# Patient Record
Sex: MALE | Race: WHITE | Employment: FULL TIME | URBAN - METROPOLITAN AREA
[De-identification: names, ages, dates, MRNs, and addresses within clinical notes are randomized per-mention and may not be internally consistent; named-entity substitution may affect disease eponyms.]

---

## 2020-03-08 PROBLEM — D17.21 LIPOMA OF RIGHT UPPER EXTREMITY: Status: ACTIVE | Noted: 2020-03-08

## 2021-01-17 ENCOUNTER — APPOINTMENT (OUTPATIENT)
Dept: CT IMAGING | Age: 36
End: 2021-01-17
Payer: COMMERCIAL

## 2021-01-17 ENCOUNTER — HOSPITAL ENCOUNTER (OUTPATIENT)
Age: 36
Setting detail: OBSERVATION
Discharge: HOME | End: 2021-01-18
Attending: INTERNAL MEDICINE | Admitting: INTERNAL MEDICINE
Payer: COMMERCIAL

## 2021-01-17 ENCOUNTER — APPOINTMENT (OUTPATIENT)
Dept: GENERAL RADIOLOGY | Age: 36
End: 2021-01-17
Payer: COMMERCIAL

## 2021-01-17 DIAGNOSIS — U07.1 COVID-19: ICD-10-CM

## 2021-01-17 DIAGNOSIS — R00.1 BRADYCARDIA: Primary | ICD-10-CM

## 2021-01-17 LAB
ABO/RH: NORMAL
ALBUMIN SERPL-MCNC: 4.2 GM/DL (ref 3.4–5)
ALBUMIN SERPL-MCNC: 4.2 GM/DL (ref 3.4–5)
ALP BLD-CCNC: 65 IU/L (ref 40–128)
ALP BLD-CCNC: 65 IU/L (ref 40–129)
ALT SERPL-CCNC: 16 U/L (ref 10–40)
ALT SERPL-CCNC: 16 U/L (ref 10–40)
ANION GAP SERPL CALCULATED.3IONS-SCNC: 10 MMOL/L (ref 4–16)
ANION GAP SERPL CALCULATED.3IONS-SCNC: 11 MMOL/L (ref 4–16)
ANTIBODY SCREEN: NEGATIVE
APTT: 31.1 SECONDS (ref 25.1–37.1)
AST SERPL-CCNC: 16 IU/L (ref 15–37)
AST SERPL-CCNC: 17 IU/L (ref 15–37)
BASOPHILS ABSOLUTE: 0 K/CU MM
BASOPHILS ABSOLUTE: 0 K/CU MM
BASOPHILS RELATIVE PERCENT: 0.3 % (ref 0–1)
BASOPHILS RELATIVE PERCENT: 0.5 % (ref 0–1)
BILIRUB SERPL-MCNC: 0.3 MG/DL (ref 0–1)
BILIRUB SERPL-MCNC: 0.6 MG/DL (ref 0–1)
BUN BLDV-MCNC: 13 MG/DL (ref 6–23)
BUN BLDV-MCNC: 14 MG/DL (ref 6–23)
C-REACTIVE PROTEIN, HIGH SENSITIVITY: 3 MG/L
CALCIUM SERPL-MCNC: 9 MG/DL (ref 8.3–10.6)
CALCIUM SERPL-MCNC: 9.4 MG/DL (ref 8.3–10.6)
CHLORIDE BLD-SCNC: 100 MMOL/L (ref 99–110)
CHLORIDE BLD-SCNC: 102 MMOL/L (ref 99–110)
CO2: 23 MMOL/L (ref 21–32)
CO2: 27 MMOL/L (ref 21–32)
CREAT SERPL-MCNC: 0.9 MG/DL (ref 0.9–1.3)
CREAT SERPL-MCNC: 1.1 MG/DL (ref 0.9–1.3)
D DIMER: 354 NG/ML(DDU)
DIFFERENTIAL TYPE: ABNORMAL
DIFFERENTIAL TYPE: ABNORMAL
EKG ATRIAL RATE: 47 BPM
EKG DIAGNOSIS: NORMAL
EKG P AXIS: 39 DEGREES
EKG P-R INTERVAL: 170 MS
EKG Q-T INTERVAL: 450 MS
EKG QRS DURATION: 86 MS
EKG QTC CALCULATION (BAZETT): 398 MS
EKG R AXIS: 29 DEGREES
EKG T AXIS: 28 DEGREES
EKG VENTRICULAR RATE: 47 BPM
EOSINOPHILS ABSOLUTE: 0 K/CU MM
EOSINOPHILS ABSOLUTE: 0.1 K/CU MM
EOSINOPHILS RELATIVE PERCENT: 0.1 % (ref 0–3)
EOSINOPHILS RELATIVE PERCENT: 0.8 % (ref 0–3)
FERRITIN: 33 NG/ML (ref 30–400)
FIBRINOGEN LEVEL: 258 MG/DL (ref 196.9–442.1)
GFR AFRICAN AMERICAN: >60 ML/MIN/1.73M2
GFR AFRICAN AMERICAN: >60 ML/MIN/1.73M2
GFR NON-AFRICAN AMERICAN: >60 ML/MIN/1.73M2
GFR NON-AFRICAN AMERICAN: >60 ML/MIN/1.73M2
GLUCOSE BLD-MCNC: 105 MG/DL (ref 70–99)
GLUCOSE BLD-MCNC: 112 MG/DL (ref 70–99)
HCT VFR BLD CALC: 45.8 % (ref 42–52)
HCT VFR BLD CALC: 46.3 % (ref 42–52)
HEMOGLOBIN: 15.1 GM/DL (ref 13.5–18)
HEMOGLOBIN: 15.2 GM/DL (ref 13.5–18)
IMMATURE NEUTROPHIL %: 0.1 % (ref 0–0.43)
IMMATURE NEUTROPHIL %: 0.2 % (ref 0–0.43)
INR BLD: 1.01 INDEX
LACTATE DEHYDROGENASE: 160 IU/L (ref 120–246)
LACTATE: 2 MMOL/L (ref 0.4–2)
LYMPHOCYTES ABSOLUTE: 1.5 K/CU MM
LYMPHOCYTES ABSOLUTE: 1.9 K/CU MM
LYMPHOCYTES RELATIVE PERCENT: 19.2 % (ref 24–44)
LYMPHOCYTES RELATIVE PERCENT: 30.4 % (ref 24–44)
MCH RBC QN AUTO: 29.5 PG (ref 27–31)
MCH RBC QN AUTO: 29.6 PG (ref 27–31)
MCHC RBC AUTO-ENTMCNC: 32.6 % (ref 32–36)
MCHC RBC AUTO-ENTMCNC: 33.2 % (ref 32–36)
MCV RBC AUTO: 89.3 FL (ref 78–100)
MCV RBC AUTO: 90.6 FL (ref 78–100)
MONOCYTES ABSOLUTE: 0.5 K/CU MM
MONOCYTES ABSOLUTE: 0.6 K/CU MM
MONOCYTES RELATIVE PERCENT: 7.2 % (ref 0–4)
MONOCYTES RELATIVE PERCENT: 8.3 % (ref 0–4)
NUCLEATED RBC %: 0 %
NUCLEATED RBC %: 0 %
PDW BLD-RTO: 13.2 % (ref 11.7–14.9)
PDW BLD-RTO: 13.4 % (ref 11.7–14.9)
PLATELET # BLD: 262 K/CU MM (ref 140–440)
PLATELET # BLD: 276 K/CU MM (ref 140–440)
PMV BLD AUTO: 9.8 FL (ref 7.5–11.1)
PMV BLD AUTO: 9.8 FL (ref 7.5–11.1)
POTASSIUM SERPL-SCNC: 4.1 MMOL/L (ref 3.5–5.1)
POTASSIUM SERPL-SCNC: 4.2 MMOL/L (ref 3.5–5.1)
PRO-BNP: 34.61 PG/ML
PROCALCITONIN: 0.04
PROTHROMBIN TIME: 12.2 SECONDS (ref 11.7–14.5)
RBC # BLD: 5.11 M/CU MM (ref 4.6–6.2)
RBC # BLD: 5.13 M/CU MM (ref 4.6–6.2)
SEGMENTED NEUTROPHILS ABSOLUTE COUNT: 3.8 K/CU MM
SEGMENTED NEUTROPHILS ABSOLUTE COUNT: 5.7 K/CU MM
SEGMENTED NEUTROPHILS RELATIVE PERCENT: 59.8 % (ref 36–66)
SEGMENTED NEUTROPHILS RELATIVE PERCENT: 73.1 % (ref 36–66)
SODIUM BLD-SCNC: 134 MMOL/L (ref 135–145)
SODIUM BLD-SCNC: 139 MMOL/L (ref 135–145)
TOTAL IMMATURE NEUTOROPHIL: 0.01 K/CU MM
TOTAL IMMATURE NEUTOROPHIL: 0.01 K/CU MM
TOTAL NUCLEATED RBC: 0 K/CU MM
TOTAL NUCLEATED RBC: 0 K/CU MM
TOTAL PROTEIN: 7.1 GM/DL (ref 6.4–8.2)
TOTAL PROTEIN: 7.1 GM/DL (ref 6.4–8.2)
TROPONIN T: <0.01 NG/ML
TROPONIN T: <0.01 NG/ML
VITAMIN D 25-HYDROXY: 37.01 NG/ML
WBC # BLD: 6.4 K/CU MM (ref 4–10.5)
WBC # BLD: 7.8 K/CU MM (ref 4–10.5)

## 2021-01-17 PROCEDURE — 36415 COLL VENOUS BLD VENIPUNCTURE: CPT

## 2021-01-17 PROCEDURE — 86140 C-REACTIVE PROTEIN: CPT

## 2021-01-17 PROCEDURE — 6370000000 HC RX 637 (ALT 250 FOR IP): Performed by: INTERNAL MEDICINE

## 2021-01-17 PROCEDURE — 94761 N-INVAS EAR/PLS OXIMETRY MLT: CPT

## 2021-01-17 PROCEDURE — 71045 X-RAY EXAM CHEST 1 VIEW: CPT

## 2021-01-17 PROCEDURE — 86141 C-REACTIVE PROTEIN HS: CPT

## 2021-01-17 PROCEDURE — 6360000004 HC RX CONTRAST MEDICATION: Performed by: INTERNAL MEDICINE

## 2021-01-17 PROCEDURE — G0378 HOSPITAL OBSERVATION PER HR: HCPCS

## 2021-01-17 PROCEDURE — 86901 BLOOD TYPING SEROLOGIC RH(D): CPT

## 2021-01-17 PROCEDURE — 85730 THROMBOPLASTIN TIME PARTIAL: CPT

## 2021-01-17 PROCEDURE — 71275 CT ANGIOGRAPHY CHEST: CPT

## 2021-01-17 PROCEDURE — 85610 PROTHROMBIN TIME: CPT

## 2021-01-17 PROCEDURE — 6360000002 HC RX W HCPCS: Performed by: INTERNAL MEDICINE

## 2021-01-17 PROCEDURE — 93010 ELECTROCARDIOGRAM REPORT: CPT | Performed by: INTERNAL MEDICINE

## 2021-01-17 PROCEDURE — 85379 FIBRIN DEGRADATION QUANT: CPT

## 2021-01-17 PROCEDURE — 84145 PROCALCITONIN (PCT): CPT

## 2021-01-17 PROCEDURE — 84484 ASSAY OF TROPONIN QUANT: CPT

## 2021-01-17 PROCEDURE — 86850 RBC ANTIBODY SCREEN: CPT

## 2021-01-17 PROCEDURE — 2580000003 HC RX 258: Performed by: INTERNAL MEDICINE

## 2021-01-17 PROCEDURE — 83605 ASSAY OF LACTIC ACID: CPT

## 2021-01-17 PROCEDURE — 96372 THER/PROPH/DIAG INJ SC/IM: CPT

## 2021-01-17 PROCEDURE — 93005 ELECTROCARDIOGRAM TRACING: CPT | Performed by: PHYSICIAN ASSISTANT

## 2021-01-17 PROCEDURE — 85384 FIBRINOGEN ACTIVITY: CPT

## 2021-01-17 PROCEDURE — 99204 OFFICE O/P NEW MOD 45 MIN: CPT | Performed by: INTERNAL MEDICINE

## 2021-01-17 PROCEDURE — 83880 ASSAY OF NATRIURETIC PEPTIDE: CPT

## 2021-01-17 PROCEDURE — 80053 COMPREHEN METABOLIC PANEL: CPT

## 2021-01-17 PROCEDURE — 86900 BLOOD TYPING SEROLOGIC ABO: CPT

## 2021-01-17 PROCEDURE — 85025 COMPLETE CBC W/AUTO DIFF WBC: CPT

## 2021-01-17 PROCEDURE — 83615 LACTATE (LD) (LDH) ENZYME: CPT

## 2021-01-17 PROCEDURE — 82306 VITAMIN D 25 HYDROXY: CPT

## 2021-01-17 PROCEDURE — 99285 EMERGENCY DEPT VISIT HI MDM: CPT

## 2021-01-17 PROCEDURE — 82728 ASSAY OF FERRITIN: CPT

## 2021-01-17 RX ORDER — ACETAMINOPHEN 325 MG/1
650 TABLET ORAL EVERY 6 HOURS PRN
Status: DISCONTINUED | OUTPATIENT
Start: 2021-01-17 | End: 2021-01-18 | Stop reason: HOSPADM

## 2021-01-17 RX ORDER — SODIUM CHLORIDE 0.9 % (FLUSH) 0.9 %
10 SYRINGE (ML) INJECTION PRN
Status: DISCONTINUED | OUTPATIENT
Start: 2021-01-17 | End: 2021-01-18 | Stop reason: HOSPADM

## 2021-01-17 RX ORDER — ACETAMINOPHEN 650 MG/1
650 SUPPOSITORY RECTAL EVERY 6 HOURS PRN
Status: DISCONTINUED | OUTPATIENT
Start: 2021-01-17 | End: 2021-01-18 | Stop reason: HOSPADM

## 2021-01-17 RX ORDER — PANTOPRAZOLE SODIUM 40 MG/1
40 TABLET, DELAYED RELEASE ORAL
Status: DISCONTINUED | OUTPATIENT
Start: 2021-01-17 | End: 2021-01-18 | Stop reason: HOSPADM

## 2021-01-17 RX ORDER — M-VIT,TX,IRON,MINS/CALC/FOLIC 27MG-0.4MG
1 TABLET ORAL DAILY
COMMUNITY

## 2021-01-17 RX ORDER — ONDANSETRON 2 MG/ML
4 INJECTION INTRAMUSCULAR; INTRAVENOUS EVERY 6 HOURS PRN
Status: DISCONTINUED | OUTPATIENT
Start: 2021-01-17 | End: 2021-01-18 | Stop reason: HOSPADM

## 2021-01-17 RX ORDER — PROMETHAZINE HYDROCHLORIDE 25 MG/1
12.5 TABLET ORAL EVERY 6 HOURS PRN
Status: DISCONTINUED | OUTPATIENT
Start: 2021-01-17 | End: 2021-01-18 | Stop reason: HOSPADM

## 2021-01-17 RX ORDER — DEXLANSOPRAZOLE 60 MG/1
60 CAPSULE, DELAYED RELEASE ORAL DAILY
COMMUNITY

## 2021-01-17 RX ORDER — SODIUM CHLORIDE 0.9 % (FLUSH) 0.9 %
10 SYRINGE (ML) INJECTION EVERY 12 HOURS SCHEDULED
Status: DISCONTINUED | OUTPATIENT
Start: 2021-01-17 | End: 2021-01-18 | Stop reason: HOSPADM

## 2021-01-17 RX ORDER — VIT C/B6/B5/MAGNESIUM/HERB 173 50-5-6-5MG
CAPSULE ORAL
COMMUNITY

## 2021-01-17 RX ORDER — POLYETHYLENE GLYCOL 3350 17 G/17G
17 POWDER, FOR SOLUTION ORAL DAILY PRN
Status: DISCONTINUED | OUTPATIENT
Start: 2021-01-17 | End: 2021-01-18 | Stop reason: HOSPADM

## 2021-01-17 RX ADMIN — ENOXAPARIN SODIUM 40 MG: 40 INJECTION SUBCUTANEOUS at 08:56

## 2021-01-17 RX ADMIN — PANTOPRAZOLE SODIUM 40 MG: 40 TABLET, DELAYED RELEASE ORAL at 06:26

## 2021-01-17 RX ADMIN — ENOXAPARIN SODIUM 105 MG: 120 INJECTION SUBCUTANEOUS at 22:25

## 2021-01-17 RX ADMIN — SODIUM CHLORIDE, PRESERVATIVE FREE 10 ML: 5 INJECTION INTRAVENOUS at 22:20

## 2021-01-17 RX ADMIN — IOPAMIDOL 75 ML: 755 INJECTION, SOLUTION INTRAVENOUS at 12:44

## 2021-01-17 RX ADMIN — SODIUM CHLORIDE, PRESERVATIVE FREE 10 ML: 5 INJECTION INTRAVENOUS at 08:56

## 2021-01-17 RX ADMIN — ENOXAPARIN SODIUM 105 MG: 120 INJECTION SUBCUTANEOUS at 11:49

## 2021-01-17 ASSESSMENT — PAIN SCALES - GENERAL: PAINLEVEL_OUTOF10: 0

## 2021-01-17 NOTE — ED NOTES
Report called to [facility] for room 4010.      Justine Oviedo  01/17/21 83

## 2021-01-17 NOTE — CONSULTS
Consult recived  Full note to follow                      Name:  Verdie Hammans /Age/Sex: 1985  (38 y.o. male)   MRN & CSN:  [id number] & [id number] Admission Date/Time: 2021  1:30 AM   Location:  [place] PCP: Nadege Duvall [address] [facility] Deangelo Calloway Day: 1          Referring physician:  Carlos Johnson MD         Reason for consultation:  ashly        Thanks for referral.    Information source: patient    CC;        HPI:   Thank you for involving me in taking  care of Verdie Hammans who  is a 38 y. o.year  Old male  Presents with  No cardiac history, very athletic, now  With mild mid sternal recurrent CP nonexertional , and ashly on monitor. 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 Past surgical history:   has a past surgical history that includes knee surgery (Left, ); other surgical history (); Shoulder arthroscopy (Left, ); and hernia repair (). Social History:   reports that he has never smoked. He has quit using smokeless tobacco. He reports current alcohol use. Family history:  family history includes Allergies in his father; Cancer in an other family member; Heart Disease in an other family member; Other in his father, mother, and another family member. Allergies   Allergen Reactions    Coconut Oil      \"It makes the inside of my mouth peel. \"           sodium chloride flush 0.9 % injection 10 mL, 2 times per day      sodium chloride flush 0.9 % injection 10 mL, PRN      promethazine (PHENERGAN) tablet 12.5 mg, Q6H PRN    Or      ondansetron (ZOFRAN) injection 4 mg, Q6H PRN      polyethylene glycol (GLYCOLAX) packet 17 g, Daily PRN      acetaminophen (TYLENOL) tablet 650 mg, Q6H PRN    Or      acetaminophen (TYLENOL) suppository 650 mg, Q6H PRN      pantoprazole (PROTONIX) tablet 40 mg, QAM AC     enoxaparin (LOVENOX) injection 105 mg, BID      Current Facility-Administered Medications   Medication Dose Route Frequency Provider Last Rate Last Admin    sodium chloride flush 0.9 % injection 10 mL  10 mL Intravenous 2 times per day Marialuisa Alva MD   10 mL at 01/17/21 0856    sodium chloride flush 0.9 % injection 10 mL  10 mL Intravenous PRN Marialuisa Alva MD        promethazine (PHENERGAN) tablet 12.5 mg  12.5 mg Oral Q6H PRN Marialuisa Alva MD        Or    ondansetron (ZOFRAN) injection 4 mg  4 mg Intravenous Q6H PRN Marialuisa Alva MD        polyethylene glycol (GLYCOLAX) packet 17 g  17 g Oral Daily PRN Marialuisa Alva MD        acetaminophen (TYLENOL) tablet 650 mg  650 mg Oral Q6H PRN Marialuisa Alva MD        Or   Mervin Bustillos acetaminophen (TYLENOL) suppository 650 mg  650 mg Rectal Q6H PRN Marialuisa Alva MD        pantoprazole (PROTONIX) tablet 40 mg  40 mg Oral QAM AC Marialuisa Alva MD   40 mg at 01/17/21 6511    enoxaparin (LOVENOX) injection 105 mg  1 mg/kg Subcutaneous BID Nicky Gandhi MD         Review of Systems:  All 14 systems reviewed, all negative except for  cp    Physical Examination:    /70   Pulse 54   Temp 97.8 °F (36.6 °C) (Oral)   Resp 8   Ht 6' 2\" (1.88 m)   Wt 235 lb 11.2 oz (106.9 kg)   SpO2 97%   BMI 30.26 kg/m²      Wt Readings from Last 3 Encounters:   01/17/21 235 lb 11.2 oz (106.9 kg)   03/04/20 147 lb (66.7 kg)   04/22/16 231 lb (104.8 kg)     Body mass index is 30.26 kg/m².       General Appearance:  fair  Head: normocephalic     Eyes: normal, noninjected conjunctiva    ENT: normal mucosa, noninjected throat, normal     NECK: No JVP  No thyromegaly        Cardiovascular: No thrills palpated   Auscultation: Normal S1 and S2,  no murmur   carotid bruit no   Abdominal Aorta no bruit    Respiratory:    Breath sounds Clear = 0    Extremities:  none Edema clubbing ,   no cyanosis    SKIN: Warm and well perfused, no pallor or cyanosis    Vascular exam:  Pedal Pulses: palp  bilaterally        Abdomen:  No masses or tenderness. No organomegaly noted. Neurological:  Oriented to time, place, and person   No focal neurological deficit noted. Psychiatric:normal mood, no anxiety    Lab Review   Recent Labs     01/17/21 0227   WBC 7.8   HGB 15.1   HCT 46.3         Recent Labs     01/17/21 0227   *   K 4.2      CO2 23   BUN 14   CREATININE 0.9     Recent Labs     01/17/21 0227   AST 17   ALT 16   BILITOT 0.3   ALKPHOS 65     No results for input(s): TROPONINI in the last 72 hours. No results found for: BNP  No results found for: INR, PROTIME        Assessment/Recommendations:     - bradycardia  ? Etiology ? Sec to COVID   - CP; serial trops, EKGs, echo  - anticoag for now   - check CTA chest    While cardiac manifestations have been reported and are now a recognized complication of PYCIQ-65 pneumonia, transient sinus bradycardia has not been well described. SARS-CoV-2 viral infection appears to induce a transient sinus bradycardia, as noted in some patients with COVID-19. While etiology could be multifactorial, severe hypoxia, damage of cardiac pacemaker cells from inflammatory cytokines, and exaggerated response to medications are possible triggers. We believe it is important to know about the potential development of transient sinus bradycardia as a part of disease sequalae and for close CV surveillance of patients. A special consideration should be made in patients with inherited arrhythmia syndromes. It is particularly important to consider this to be a possible warning sign of a serious cytokine storm onset. As there are many medications being used empirically in patients, there is a need for increased awareness of possible drug interactions and close monitoring due to potential effects on AV conduction, QTc interval prolongation as well as worsening bradycardia.  Bradycardia could be a possible predictor of

## 2021-01-17 NOTE — ED PROVIDER NOTES
Emergency [address] EMERGENCY DEPARTMENT    Patient: Anjel Fleming  MRN: [id number]  : 1985  Date of Evaluation: 2021  ED Provider: Serafin Perdue PA-C    Chief Complaint       Chief Complaint   Patient presents with    Chest Pain    Positive For Covid-19       ESTELLE Fleming is a 38 y.o. male who presents to the emergency department for bradycardia and tingling. Patient tested positive for COVID-19 5 days ago. He reports a known positive exposure, which is why he had testing performed, but then actually developed symptoms later that same day. He reports loss of taste/smell, fatigue, chills, cough/congestion. Earlier today, he states he just began to feel different. He reports a heaviness in the chest and intermittent tingling in his fingertips. Tonight his wife checked his pulse and noted it was in the 40s. Upon review of his Apple watch,noted HR has been in the 40s since COVID diagnosis, so his wife recommended coming in to get checked out. He reports family history of CAD, denies any personal history. Denies any personal history of HTN, HLD, DM. Non-smoker. ROS     CONSTITUTIONAL:  + fatigue, chills. EYES:  Denies visual changes. HEAD:  Denies headache. ENT:  + congestion, loss of taste/smell. NECK:  Denies neck pain. RESPIRATORY:  + cough. CARDIOVASCULAR:  + chest discomfort. GI:  Denies nausea or vomiting. :  Denies urinary symptoms. MUSCULOSKELETAL:  Denies extremity pain or swelling. BACK:  Denies back pain. INTEGUMENT:  Denies skin changes. LYMPHATIC:  Denies lymphadenopathy. NEUROLOGIC:  + fingertip tingling.     Past History     Past Medical History:   Diagnosis Date    Heartburn     History of fracture of clavicle     History of wrist fracture     2 left and 2 right     Past Surgical History:   Procedure Laterality Date    HERNIA REPAIR  2009    KNEE SURGERY Left     OTHER SURGICAL HISTORY  2006    testicular torsion    SHOULDER ARTHROSCOPY Left 2013     Social History     Socioeconomic History    Marital status:      Spouse name: Not on file    Number of children: Not on file    Years of education: Not on file    Highest education level: Not on file   Occupational History    Not on file   Social Needs    Financial resource strain: Not on file    Food insecurity     Worry: Not on file     Inability: Not on file    Transportation needs     Medical: Not on file     Non-medical: Not on file   Tobacco Use    Smoking status: Never Smoker    Smokeless tobacco: Former User   Substance and Sexual Activity    Alcohol use: Yes     Comment: social     Drug use: Not on file    Sexual activity: Not on file   Lifestyle    Physical activity     Days per week: Not on file     Minutes per session: Not on file    Stress: Not on file   Relationships    Social connections     Talks on phone: Not on file     Gets together: Not on file     Attends Christianity service: Not on file     Active member of club or organization: Not on file     Attends meetings of clubs or organizations: Not on file     Relationship status: Not on file    Intimate partner violence     Fear of current or ex partner: Not on file     Emotionally abused: Not on file     Physically abused: Not on file     Forced sexual activity: Not on file   Other Topics Concern    Not on file   Social History Narrative    Not on file       Medications/Allergies     Previous Medications    ESOMEPRAZOLE MAGNESIUM ([address] PO)    Take  by mouth. HUMIRA 40 MG/0.4ML PSKT        PREDNISONE (DELTASONE) 5 MG TABLET        SULFAMETHOXAZOLE-TRIMETHOPRIM (BACTRIM DS;SEPTRA DS) 800-160 MG PER TABLET         No Known Allergies     Physical Exam       ED Triage Vitals   BP Temp Temp src Pulse Resp SpO2 Height Weight   -- -- -- -- -- -- -- --     GENERAL APPEARANCE:  Well-developed, well-nourished, no acute distress.   HEAD: NC/AT. EYES:  Sclera anicteric. ENT:  Ears, nose, mouth normal.     NECK:  Supple. CARDIO:  Bradycardic in the 45s. LUNGS:   Respirations unlabored. EXTREMITIES:  No acute deformities. SKIN:  Warm and dry. NEUROLOGICAL:  Alert and oriented. PSYCHIATRIC:  Normal mood. Diagnostics     Labs:  Labs Reviewed   CBC WITH AUTO DIFFERENTIAL - Abnormal; Notable for the following components:       Result Value    Segs Relative 73.1 (*)     Lymphocytes % 19.2 (*)     Monocytes % 7.2 (*)     All other components within normal limits   COMPREHENSIVE METABOLIC PANEL W/ REFLEX TO MG FOR LOW K - Abnormal; Notable for the following components:    Sodium 134 (*)     Glucose 105 (*)     All other components within normal limits   TROPONIN   BRAIN NATRIURETIC PEPTIDE     Radiographs:  Xr Chest Portable    Result Date: 1/17/2021  EXAMINATION: ONE XRAY VIEW OF THE CHEST 1/17/2021 1:40 am COMPARISON: None. HISTORY: ORDERING SYSTEM PROVIDED HISTORY: cp, covid TECHNOLOGIST PROVIDED HISTORY: Reason for exam:->cp, covid Reason for Exam: Covid+ Acuity: Acute Type of Exam: Initial FINDINGS: There is no acute consolidation or effusion. There is no pneumothorax. The mediastinal structures are unremarkable. The upper abdomen is unremarkable. The extrathoracic soft tissues are unremarkable. There is no acute osseous abnormality. No acute cardiopulmonary process. Procedures/EKG:   Please see Dr. Sunny Hubbard note for interpretation. ED Course and MDM   -  Patient seen and evaluated in the emergency department. -  Triage and nursing notes reviewed and incorporated. -  Old chart records reviewed and incorporated. -  Supervising physician was Dr. Kwan. Patient was seen independently. -  Work-up included:  See above  -  Results discussed with patient. CXR without any acute findings. Labs are unremarkable, negative troponin. Bradycardic in 40s at times. HEART score is a 1.   I did speak with Dr. Meseret Goldberg, hospitalist, who will admit for further evaluation. In light of current events, I did utilize appropriate PPE (including N95 and surgical face mask, safety glasses, and gloves, as recommended by the health facility/national standard best practice, during my bedside interactions with the patient. Final Impression      1. Bradycardia    2.  Farhan Soares 60, [address] [place], [place]  01/17/21 7760

## 2021-01-17 NOTE — ED PROVIDER NOTES
EKG per my interpretation demonstrates sinus bradycardia with sinus arrhythmia at a rate of 47 bpm.  Normal axis. Normal intervals. No acute ST segment changes.       [address] DO [place]  01/17/21 9033

## 2021-01-17 NOTE — H&P
HISTORY AND PHYSICAL  (Hospitalist, Internal Medicine)  IDENTIFYING INFORMATION   PATIENT:  Trinity Velázquez  MRN:  [id number]  ADMIT DATE: 1/17/2021      CHIEF COMPLAINT   Chest tightness, bradycardia    HISTORY OF PRESENT ILLNESS   Trinity Velázquez is a 38 y.o. male with rheumatoid arthritis, GERD, recently tested positive for Covid-1/12/2021 presented to ED mainly due to bradycardia. Patient reported that since he was tested positive for Covid, is extremely fatigued, feels heaviness in his chest.  Since last 2 days his symptoms have gotten worse, also reported tingling in his finger tips. His wife who is an RN checked his oxygen with pulse oximeter and noted that he was bradycardic with HR 45. Then she checked his iPhone which trended his heart rate in the last month(12/20-1/17) with an average heart rate of 53. But the heart rate was significantly getting lower in the last few days since Covid diagnosis with an average heart rate of 48, lowest today at 45. Patient's wife got concerned as they heard about young people dying of Covid due to cardiac complications and she wanted him to be evaluated in ER. Patient denied any dizziness, denied any presyncope/syncope, had intermittent fever, feeling hot and cold, denied any cough. Patient reported that he has taken OTC Tylenol sinus intermittently within the last few days. Patient denied taking any other medications. Initial vitals in ED-/75, HR 46, RR 17, temperature 98.1, saturating 96% on room air. Lab work significant for sodium 134, other lab work within normal range. Chest x-ray-no acute process. EKG with sinus bradycardia. Troponin less than 0.010.     PAST MEDICAL HISTORY PAST SURGICAL HISTORY   Rheumatoid arthritis, GERD  left shoulder surgery, left knee surgery, hernia repair   FAMILY HISTORY SOCIAL HISTORY    Diabetes, congestive heart failure   denies smoking, occasional alcohol use, denied any illicit drug abuse   MEDICATIONS ALLERGIES Prednisone as needed, Dexilant   no known drug allergies       PAST MEDICAL, SURGICAL, FAMILY, and SOCIAL HISTORY         Past Medical History:   Diagnosis Date    Heartburn     History of fracture of clavicle     History of wrist fracture     2 left and 2 right     Past Surgical History:   Procedure Laterality Date    HERNIA REPAIR  2009    KNEE SURGERY Left 1998    OTHER SURGICAL HISTORY  2006    testicular torsion    SHOULDER ARTHROSCOPY Left 2013     Family History   Problem Relation Age of Onset    Cancer Other         grandfather    Heart Disease Other         mother's side    Allergies Father     Other Father         drug/alcohol abuse/ulcer    Other Mother         drug/alcohol abuse    Other Other         ulcer-  grandfather     Family Hx of HTN  Family Hx as reviewed above, otherwise non-contributory  Social History     Socioeconomic History    Marital status:      Spouse name: None    Number of children: None    Years of education: None    Highest education level: None   Occupational History    None   Social Needs    Financial resource strain: None    Food insecurity     Worry: None     Inability: None    Transportation needs     Medical: None     Non-medical: None   Tobacco Use    Smoking status: Never Smoker    Smokeless tobacco: Former User   Substance and Sexual Activity    Alcohol use: Yes     Comment: social     Drug use: None    Sexual activity: None   Lifestyle    Physical activity     Days per week: None     Minutes per session: None    Stress: None   Relationships    Social connections     Talks on phone: None     Gets together: None     Attends Baptism service: None     Active member of club or organization: None     Attends meetings of clubs or organizations: None     Relationship status: None    Intimate partner violence     Fear of current or ex partner: None     Emotionally abused: None     Physically abused: None     Forced sexual activity: None Other Topics Concern    None   Social History Narrative    None       MEDICATIONS   Medications Prior to Admission  Not in a hospital admission. Current Medications  No current facility-administered medications for this encounter. Current Outpatient Medications   Medication Sig Dispense Refill    predniSONE (DELTASONE) 5 MG tablet       HUMIRA 40 MG/0.4ML PSKT       sulfamethoxazole-trimethoprim (BACTRIM DS;SEPTRA DS) 800-160 MG per tablet       Esomeprazole Magnesium (NEXIUM PO) Take  by mouth. Allergies  No Known Allergies    REVIEW OF SYSTEMS   Within above limitations. 14 point review of systems reviewed. Pertinent positive or negative as per HPI or otherwise negative per 14 point systems review. PHYSICAL EXAM     Wt Readings from Last 3 Encounters:   03/04/20 147 lb (66.7 kg)   04/22/16 231 lb (104.8 kg)   04/05/16 231 lb (104.8 kg)       Blood pressure 117/73, pulse (!) 42, temperature 98.1 °F (36.7 °C), temperature source Oral, resp. rate 17, SpO2 97 %. GEN  -Awake, alert, NAD.   EYES   -PERRL. HENT  -MM are moist.   RESP  -LS CTA equal bilat, no wheezes, rales or rhonchi. Symmetric chest movement. No respiratory distress noted. C/V  -S1/S2 auscultated, bradycardia without appreciable M/R/G. No peripheral edema. MS  -B/L extremities - No gross joint deformities. No swelling, intact sensation symmetrical.   SKIN  -Normal coloration, warm, dry. NEURO  -CN 2-12 appear grossly intact, normal speech, no lateralizing weakness. PSYC  -Awake, alert, oriented x 3. Appropriate affect. LABS AND IMAGING     Results for Antelmo Courser (MRN [id number]) as of 1/17/2021 05:54   Ref.  Range 1/17/2021 02:27   Sodium Latest Ref Range: 135 - 145 MMOL/L 134 (L)   Potassium Latest Ref Range: 3.5 - 5.1 MMOL/L 4.2   Chloride Latest Ref Range: 99 - 110 mMol/L 100   CO2 Latest Ref Range: 21 - 32 MMOL/L 23   BUN Latest Ref Range: 6 - 23 MG/DL 14   Creatinine Latest Ref Range: 0.9 - 1.3 MG/DL 0.9   Anion Gap Latest Ref Range: 4 - 16  11   GFR Non- Latest Ref Range: >60 mL/min/1.73m2 >60   GFR African American Latest Ref Range: >60 mL/min/1.73m2 >60   Glucose Latest Ref Range: 70 - 99 MG/ (H)   Calcium Latest Ref Range: 8.3 - 10.6 MG/DL 9.4   Total Protein Latest Ref Range: 6.4 - 8.2 GM/DL 7.1   Pro-BNP Latest Ref Range: <300 PG/ML 34.61   Troponin T Latest Ref Range: <0.01 NG/ML <0.010   Albumin Latest Ref Range: 3.4 - 5.0 GM/DL 4.2   Alk Phos Latest Ref Range: 40 - 129 IU/L 65   ALT Latest Ref Range: 10 - 40 U/L 16   AST Latest Ref Range: 15 - 37 IU/L 17   Bilirubin Latest Ref Range: 0.0 - 1.0 MG/DL 0.3   WBC Latest Ref Range: 4.0 - 10.5 K/CU MM 7.8   RBC Latest Ref Range: 4.6 - 6.2 M/CU MM 5.11   Hemoglobin Quant Latest Ref Range: 13.5 - 18.0 GM/DL 15.1   Hematocrit Latest Ref Range: 42 - 52 % 46.3   MCV Latest Ref Range: 78 - 100 FL 90.6   MCH Latest Ref Range: 27 - 31 PG 29.5   MCHC Latest Ref Range: 32.0 - 36.0 % 32.6   MPV Latest Ref Range: 7.5 - 11.1 FL 9.8   RDW Latest Ref Range: 11.7 - 14.9 % 13.2   Platelet Count Latest Ref Range: 140 - 440 K/CU    Lymphocyte % Latest Ref Range: 24 - 44 % 19.2 (L)   Monocytes % Latest Ref Range: 0 - 4 % 7.2 (H)   Eosinophils % Latest Ref Range: 0 - 3 % 0.1   Basophils % Latest Ref Range: 0 - 1 % 0.3   Lymphocytes Absolute Latest Units: K/CU MM 1.5   Monocytes Absolute Latest Units: K/CU MM 0.6   Eosinophils Absolute Latest Units: K/CU MM 0.0   Basophils Absolute Latest Units: K/CU MM 0.0   Differential Type Unknown AUTOMATED DIFFERENTIAL   Segs Relative Latest Ref Range: 36 - 66 % 73.1 (H)   Segs Absolute Latest Units: K/CU MM 5.7   Nucleated RBC % Latest Units: % 0.0   Immature Neutrophil % Latest Ref Range: 0 - 0.43 % 0.1   Total Immature Neutrophil Latest Units: K/CU MM 0.01   Total Nucleated RBC Latest Units: K/CU MM 0.0     Recent Imaging    XR CHEST PORTABLE [[id number]] Collected: 01/17/21 0221      Order Status: Completed Updated: 01/17/21 0224     Narrative:       EXAMINATION:   ONE XRAY VIEW OF THE CHEST     1/17/2021 1:40 am     COMPARISON:   None. HISTORY:   ORDERING SYSTEM PROVIDED HISTORY: cp, covid   TECHNOLOGIST PROVIDED HISTORY:   Reason for exam:->cp, covid   Reason for Exam: Covid+   Acuity: Acute   Type of Exam: Initial     FINDINGS:   There is no acute consolidation or effusion.  There is no pneumothorax.  The   mediastinal structures are unremarkable.  The upper abdomen is unremarkable. The extrathoracic soft tissues are unremarkable. Stella Spine is no acute osseous   abnormality.      Impression:       No acute cardiopulmonary process.          Relevant labs and imaging reviewed    ASSESSMENT AND PLAN     #.  Bradycardia: Asymptomatic  -Monitor on telemetry  -Cardiology consult. #.  [id number]   -Patient admits to having fatigue, denied any shortness of breath  -Afebrile in the hospital, saturating well on room air    #. Rheumatoid arthritis  -Patient reports he takes prednisone 10 mg as needed. #.  GERD: On Dexilant  -Continue Protonix    DVT Prophylaxis: Lovenox   GI Prophylaxis: Protonix  Code Status: FULL.       Case d/w ED physician    Peggy Liz MD  Hospitalist, Internal Medicine  1/17/2021 at 4:22 AM

## 2021-01-18 ENCOUNTER — APPOINTMENT (OUTPATIENT)
Dept: ULTRASOUND IMAGING | Age: 36
End: 2021-01-18
Payer: COMMERCIAL

## 2021-01-18 VITALS
HEIGHT: 74 IN | DIASTOLIC BLOOD PRESSURE: 73 MMHG | TEMPERATURE: 97.8 F | SYSTOLIC BLOOD PRESSURE: 108 MMHG | WEIGHT: 235.7 LBS | BODY MASS INDEX: 30.25 KG/M2 | HEART RATE: 46 BPM | RESPIRATION RATE: 16 BRPM | OXYGEN SATURATION: 93 %

## 2021-01-18 LAB
ALBUMIN SERPL-MCNC: 4.1 GM/DL (ref 3.4–5)
ALP BLD-CCNC: 66 IU/L (ref 40–128)
ALT SERPL-CCNC: 17 U/L (ref 10–40)
ANION GAP SERPL CALCULATED.3IONS-SCNC: 13 MMOL/L (ref 4–16)
APTT: 41 SECONDS (ref 25.1–37.1)
AST SERPL-CCNC: 20 IU/L (ref 15–37)
BASOPHILS ABSOLUTE: 0 K/CU MM
BASOPHILS RELATIVE PERCENT: 0.7 % (ref 0–1)
BILIRUB SERPL-MCNC: 0.3 MG/DL (ref 0–1)
BUN BLDV-MCNC: 15 MG/DL (ref 6–23)
C-REACTIVE PROTEIN, HIGH SENSITIVITY: 3 MG/L
CALCIUM SERPL-MCNC: 9 MG/DL (ref 8.3–10.6)
CHLORIDE BLD-SCNC: 102 MMOL/L (ref 99–110)
CO2: 21 MMOL/L (ref 21–32)
CREAT SERPL-MCNC: 1 MG/DL (ref 0.9–1.3)
D DIMER: 303 NG/ML(DDU)
DIFFERENTIAL TYPE: ABNORMAL
EOSINOPHILS ABSOLUTE: 0.2 K/CU MM
EOSINOPHILS RELATIVE PERCENT: 2.5 % (ref 0–3)
FIBRINOGEN LEVEL: 232 MG/DL (ref 196.9–442.1)
GFR AFRICAN AMERICAN: >60 ML/MIN/1.73M2
GFR NON-AFRICAN AMERICAN: >60 ML/MIN/1.73M2
GLUCOSE BLD-MCNC: 91 MG/DL (ref 70–99)
HCT VFR BLD CALC: 47.3 % (ref 42–52)
HEMOGLOBIN: 15.7 GM/DL (ref 13.5–18)
IMMATURE NEUTROPHIL %: 0.2 % (ref 0–0.43)
INR BLD: 1.01 INDEX
LV EF: 53 %
LVEF MODALITY: NORMAL
LYMPHOCYTES ABSOLUTE: 2.4 K/CU MM
LYMPHOCYTES RELATIVE PERCENT: 39.3 % (ref 24–44)
MCH RBC QN AUTO: 29.6 PG (ref 27–31)
MCHC RBC AUTO-ENTMCNC: 33.2 % (ref 32–36)
MCV RBC AUTO: 89.2 FL (ref 78–100)
MONOCYTES ABSOLUTE: 0.6 K/CU MM
MONOCYTES RELATIVE PERCENT: 10.1 % (ref 0–4)
NUCLEATED RBC %: 0 %
PDW BLD-RTO: 13.2 % (ref 11.7–14.9)
PLATELET # BLD: 254 K/CU MM (ref 140–440)
PMV BLD AUTO: 9.5 FL (ref 7.5–11.1)
POTASSIUM SERPL-SCNC: 4.4 MMOL/L (ref 3.5–5.1)
PROTHROMBIN TIME: 12.2 SECONDS (ref 11.7–14.5)
RBC # BLD: 5.3 M/CU MM (ref 4.6–6.2)
SEGMENTED NEUTROPHILS ABSOLUTE COUNT: 2.9 K/CU MM
SEGMENTED NEUTROPHILS RELATIVE PERCENT: 47.2 % (ref 36–66)
SODIUM BLD-SCNC: 136 MMOL/L (ref 135–145)
TOTAL IMMATURE NEUTOROPHIL: 0.01 K/CU MM
TOTAL NUCLEATED RBC: 0 K/CU MM
TOTAL PROTEIN: 7 GM/DL (ref 6.4–8.2)
WBC # BLD: 6.1 K/CU MM (ref 4–10.5)

## 2021-01-18 PROCEDURE — 85610 PROTHROMBIN TIME: CPT

## 2021-01-18 PROCEDURE — 2580000003 HC RX 258: Performed by: INTERNAL MEDICINE

## 2021-01-18 PROCEDURE — 85025 COMPLETE CBC W/AUTO DIFF WBC: CPT

## 2021-01-18 PROCEDURE — 6370000000 HC RX 637 (ALT 250 FOR IP): Performed by: INTERNAL MEDICINE

## 2021-01-18 PROCEDURE — 96372 THER/PROPH/DIAG INJ SC/IM: CPT

## 2021-01-18 PROCEDURE — APPNB15 APP NON BILLABLE TIME 0-15 MINS: Performed by: NURSE PRACTITIONER

## 2021-01-18 PROCEDURE — 85384 FIBRINOGEN ACTIVITY: CPT

## 2021-01-18 PROCEDURE — 85730 THROMBOPLASTIN TIME PARTIAL: CPT

## 2021-01-18 PROCEDURE — 86140 C-REACTIVE PROTEIN: CPT

## 2021-01-18 PROCEDURE — 93306 TTE W/DOPPLER COMPLETE: CPT

## 2021-01-18 PROCEDURE — 6360000002 HC RX W HCPCS: Performed by: INTERNAL MEDICINE

## 2021-01-18 PROCEDURE — 99214 OFFICE O/P EST MOD 30 MIN: CPT | Performed by: INTERNAL MEDICINE

## 2021-01-18 PROCEDURE — 93970 EXTREMITY STUDY: CPT

## 2021-01-18 PROCEDURE — 80053 COMPREHEN METABOLIC PANEL: CPT

## 2021-01-18 PROCEDURE — 99243 OFF/OP CNSLTJ NEW/EST LOW 30: CPT | Performed by: NURSE PRACTITIONER

## 2021-01-18 PROCEDURE — 85379 FIBRIN DEGRADATION QUANT: CPT

## 2021-01-18 PROCEDURE — G0378 HOSPITAL OBSERVATION PER HR: HCPCS

## 2021-01-18 PROCEDURE — 99254 IP/OBS CNSLTJ NEW/EST MOD 60: CPT | Performed by: INTERNAL MEDICINE

## 2021-01-18 PROCEDURE — 94761 N-INVAS EAR/PLS OXIMETRY MLT: CPT

## 2021-01-18 RX ORDER — PREDNISONE 1 MG/1
5 TABLET ORAL 2 TIMES DAILY PRN
Qty: 1 TABLET | Refills: 0
Start: 2021-01-18

## 2021-01-18 RX ADMIN — PANTOPRAZOLE SODIUM 40 MG: 40 TABLET, DELAYED RELEASE ORAL at 05:43

## 2021-01-18 RX ADMIN — SODIUM CHLORIDE, PRESERVATIVE FREE 10 ML: 5 INJECTION INTRAVENOUS at 10:44

## 2021-01-18 RX ADMIN — ENOXAPARIN SODIUM 105 MG: 120 INJECTION SUBCUTANEOUS at 10:43

## 2021-01-18 ASSESSMENT — ENCOUNTER SYMPTOMS
CONSTIPATION: 0
ABDOMINAL PAIN: 0
EYE ITCHING: 0
EYE REDNESS: 0
BACK PAIN: 0
SHORTNESS OF BREATH: 1
DIARRHEA: 0
NAUSEA: 0
COUGH: 0
SINUS PRESSURE: 0
ABDOMINAL DISTENTION: 0
SINUS PAIN: 0
COLOR CHANGE: 0
BLOOD IN STOOL: 0
VOMITING: 0
CHEST TIGHTNESS: 0

## 2021-01-18 NOTE — CONSULTS
Electrophysiology Consult Note    Due to the current efforts to prevent transmission of COVID-19 and also the need to preserve PPE for other caregivers, a face-to-face encounter with the patient was not performed. That being said, all relevant records and diagnostic tests were reviewed, including laboratory results and imaging. Please reference any relevant documentation elsewhere. Care will be coordinated with the primary service. Reason for consultation: bradycardia    Chief complaint: fatigue and bradycardia    Referring physician: Dr Rowena Kumar      Primary care physician: Ricki Rosas MD      History of Present Illness:     Nena Fortune is a 39-year-old male history of GERD and rheumatoid arthritis. He presents with complaints of fatigue and bradycardia. He reports that he recently tested positive for Covid on 1/12/2021. He has progressively become more fatigued since the diagnosis. He states that with ambulation he would have some mild shortness of breath, with chest pressure. He states that he would become extremely tired when ambulating from bathroom to bedroom. He additionally would have some chest heaviness and noted to have tingling in his fingertips  His wife is a nurse and she checked his heart rate due to his complaints and his heart rate was in the 40s. Patient denies previous cardiac history. He does report that he is very active and exercises regularly. He ran 3 miles just 1 week ago. Electrophysiology was consulted for noted bradycardia on telemetry.     Past medical history:   Past Medical History:   Diagnosis Date    Heartburn     History of fracture of clavicle     History of wrist fracture     2 left and 2 right       Surgical history :  Past Surgical History:   Procedure Laterality Date    HERNIA REPAIR  2009    KNEE SURGERY Left 1998    OTHER SURGICAL HISTORY  2006    testicular torsion    SHOULDER ARTHROSCOPY Left 2013       Family history:   Family History Problem Relation Age of Onset    Cancer Other         grandfather    Heart Disease Other         mother's side    Allergies Father     Other Father         drug/alcohol abuse/ulcer    Other Mother         drug/alcohol abuse    Other Other         ulcer-  grandfather       Social history :  reports that he has never smoked. He has quit using smokeless tobacco. He reports current alcohol use. Allergies   Allergen Reactions    Coconut Oil      \"It makes the inside of my mouth peel. \"       No current facility-administered medications on file prior to encounter. Current Outpatient Medications on File Prior to Encounter   Medication Sig Dispense Refill    dexlansoprazole (DEXILANT) 60 MG CPDR delayed release capsule Take 60 mg by mouth daily      Turmeric 500 MG CAPS Take by mouth      Multiple Vitamins-Minerals (THERAPEUTIC MULTIVITAMIN-MINERALS) tablet Take 1 tablet by mouth daily      Nutritional Supplements (IMMUNE ENHANCE PO) Take by mouth      predniSONE (DELTASONE) 5 MG tablet       HUMIRA 40 MG/0.4ML PSKT       sulfamethoxazole-trimethoprim (BACTRIM DS;SEPTRA DS) 800-160 MG per tablet       Esomeprazole Magnesium (NEXIUM PO) Take  by mouth. Review of Systems:   Review of Systems   Constitutional: Positive for activity change and fatigue. Negative for appetite change. HENT: Negative for congestion, sinus pressure and sinus pain. Eyes: Negative for redness and itching. Respiratory: Positive for shortness of breath. Negative for cough and chest tightness. Cardiovascular: Positive for chest pain. Negative for palpitations and leg swelling. Gastrointestinal: Negative for abdominal distention, abdominal pain, blood in stool, constipation, diarrhea, nausea and vomiting. Genitourinary: Negative for difficulty urinating and dysuria. Musculoskeletal: Negative for arthralgias, back pain and gait problem. Skin: Negative for color change, pallor, rash and wound.

## 2021-01-18 NOTE — PROGRESS NOTES
CARDIOLOGY  NOTE        Name:  Anjel Fleming /Age/Sex: 1985  (38 y.o. male)   MRN & CSN:  [id number] & [id number] Admission Date/Time: 2021  1:30 AM   Location:  [facility]– [place] PCP: Keith Lloyd MD       Hospital Day: 2        PLAN FROM CARDIOLOGY FOR TODAY:   echo      - cardiology consult is for:  Cp, shad    -  Interval history:  Had ct    · ASSESSMENT/ PLAN:  1. Normal echo  2. CT with nonsp findings  3. Shad resolved            Subjective: Todays complain: Patient  No cp    HPI:  Lex Barakat is a 38 y. o.year old who and presents with had concerns including Chest Pain and Positive For Covid-19. Chief Complaint   Patient presents with    Chest Pain    Positive For Covid-19           Objective: Temperature:  Current - Temp: 97.8 °F (36.6 °C); Max - Temp  Av.7 °F (36.5 °C)  Min: 97.6 °F (36.4 °C)  Max: 97.8 °F (36.6 °C)    Respiratory Rate : Resp  Avg: 15.7  Min: 13  Max: 18    Pulse Range: Pulse  Av.3  Min: 41  Max: 61    Blood Presuure Range:  Systolic (13IAT), MEN:799 , Min:108 , EAA:227   ; Diastolic (83OXE), LAF:24, Min:66, Max:73      Pulse ox Range: SpO2  Av %  Min: 93 %  Max: 97 %    24hr I & O:      Intake/Output Summary (Last 24 hours) at 2021 1539  Last data filed at 2021 1043  Gross per 24 hour   Intake 10 ml   Output 1500 ml   Net -1490 ml         /73   Pulse (!) 46 Comment: notified RN Reba  Temp 97.8 °F (36.6 °C) (Oral)   Resp 16   Ht 6' 2\" (1.88 m)   Wt 235 lb 11.2 oz (106.9 kg)   SpO2 93%   BMI 30.26 kg/m²           Review of Systems:  All 14 systems reviewed, all negative       TELEMETRY: Sinus    has a past medical history of Heartburn, History of fracture of clavicle, and History of wrist fracture. has a past surgical history that includes knee surgery (Left, ); other surgical history (); Shoulder arthroscopy (Left, ); and hernia repair ().   Physical Exam:  General:  Awake, alert, NAD  Head:normal  Eye:normal  Neck:  No JVD   Chest:  Clear to auscultation, respiration easy  Cardiovascular:  RRR S1S2  Abdomen:   nontender  Extremities:  no edema  Pulses; palpable  Neuro: grossly normal    Medications:    sodium chloride flush  10 mL Intravenous 2 times per day    pantoprazole  40 mg Oral QAM AC    enoxaparin  1 mg/kg Subcutaneous BID       sodium chloride flush, promethazine **OR** ondansetron, polyethylene glycol, acetaminophen **OR** acetaminophen    Lab Data:  CBC:   Recent Labs     01/17/21 0227 01/17/21 1114 01/18/21  0550   WBC 7.8 6.4 6.1   HGB 15.1 15.2 15.7   HCT 46.3 45.8 47.3   MCV 90.6 89.3 89.2    262 254     BMP:   Recent Labs     01/17/21 0227 01/17/21 1114 01/18/21  0550   * 139 136   K 4.2 4.1 4.4    102 102   CO2 23 27 21   BUN 14 13 15   CREATININE 0.9 1.1 1.0     LIVER PROFILE:   Recent Labs     01/17/21 0227 01/17/21 1114 01/18/21  0550   AST 17 16 20   ALT 16 16 17   BILITOT 0.3 0.6 0.3   ALKPHOS 65 65 66     PT/INR:   Recent Labs     01/17/21 1114 01/18/21  0550   PROTIME 12.2 12.2   INR 1.01 1.01     APTT:   Recent Labs     01/17/21 1114 01/18/21  0550   APTT 31.1 41.0*     BNP:  No results for input(s): BNP in the last 72 hours.   TROPONIN: @TROPONINI:3@  Labs, consult, tests reviewed    Assessment/ PLAN:    As above                  Fay Leal MD 1/18/2021 3:39 PM

## 2021-01-18 NOTE — CONSULTS
Infectious Disease Consult Note  2021   Patient Name: Jeanne Avina : 1985   Impression   Moderate COVID-19 disease  o Symptom onset: 2021  o Bradycardia is not due to myocarditis.  RA involving hand and feet.  Overweight   Multi-morbidity: per PMHx  Plan:   Therapeutic: dexamethasone and remdesivir are not needed   May be discharged from an ID standpoint.  Diagnostic:   F/u test:     Thank you for allowing me to consult in the care of this patient.  ------------------------  REASON FOR CONSULT: Infective syndrome   Requested by: Dr. Britta Northson is a 38 y.o.  male who was admitted 2021 for further evaluation and management of fatigue and cough. Exposed to someone with Amaya. His wife and children have tested positive. He developed symptoms on , had worsening fatigue, cough, anosmia. He was prompted to come to the hospital by his wife as his heart rate was in the 40s (which is unusual for him). He runs 3 miles every other day. Denies n/v/d/f/c.  ? Infectious diseases service was consulted to evaluate the pt, and recommend further investigative and therapeutic measures. Review and summary of old records:  ROS: Other systems reviewed Including eyes, ENT, respiratory, cardiovascular, GI, , dermatologic, neurologic, psych, hem/lymphatic, musculoskeletal and endocrine were negative other than what is mentioned above.      Patient Active Problem List    Diagnosis Date Noted    Bradycardia 2021    Lipoma of right upper extremity 2020    Tennis elbow 2016     Past Medical History:   Diagnosis Date    Heartburn     History of fracture of clavicle     History of wrist fracture     2 left and 2 right      Past Surgical History:   Procedure Laterality Date    HERNIA REPAIR  2009    KNEE SURGERY Left 1998    OTHER SURGICAL HISTORY  2006    testicular torsion    SHOULDER ARTHROSCOPY Left 2013      Family History   Problem Relation Age of Onset    Cancer Other         grandfather    Heart Disease Other         mother's side    Allergies Father     Other Father         drug/alcohol abuse/ulcer    Other Mother         drug/alcohol abuse    Other Other         ulcer-  grandfather      Infectious disease related family history - not contibutory. SOCIAL HISTORY  Social History     Tobacco Use    Smoking status: Never Smoker    Smokeless tobacco: Former User   Substance Use Topics    Alcohol use: Yes     Comment: social        Born:   Lived   Occupation:   No recent travel of significance.  No recent unusual exposures.  NO pets   ? ALLERGIES  Allergies   Allergen Reactions    Coconut Oil      \"It makes the inside of my mouth peel. \"      MEDICATIONS  Reviewed and are per the chart/EMR. IMMUNIZATION HISTORY    There is no immunization history on file for this patient. ? Antibiotics:   Nil.   ?  -------------------------------------------------------------------------------------------------------------------    Vital Signs:  Vitals:    01/18/21 0427   BP: 108/73   Pulse: (!) 46   Resp: 16   Temp: 97.8 °F (36.6 °C)   SpO2: 97%         Exam:    VS: noted; wt   Gen: alert and oriented X3, no distress  Skin: no stigmata of endocarditis  Wounds: C/D/I  HEMT: AT/NC Oropharynx pink, moist, and without lesions or exudates; dentition in good state of repair  Eyes: PERRLA, EOMI, conjunctiva pink, sclera anicteric. Neck: Supple. Trachea midline. No LAD. Chest: no distress and CTA. Good air movement. Heart: RRR and no MRG. Abd: soft, non-distended, no tenderness, no hepatomegaly. Normoactive bowel sounds. Ext: no clubbing, cyanosis, or edema  Catheter Site: without erythema or tenderness  LDA: CVC:  Neuro: Mental status intact. CN 2-12 intact and no focal sensory or motor deficits    ? Diagnostic Studies: reviewed  CTA chest   Impression:   1. No findings of pulmonary embolism.    2. Minimal to mild bronchial wall thickening potentially due to pulmonary   vascular congestion, reactive airways disease, or bronchitis. 3. Mild cardiomegaly with the appearance potentially accentuated by mass   effect from the sternum in the setting of mild pectus excavatum. ??  I have examined this patient and available medical records on this date and have made the above observations, conclusions and recommendations.   Electronically signed by: Electronically signed by Jeannine Holcomb MD on 1/18/2021 at 10:44 AM

## 2021-01-19 ENCOUNTER — TELEPHONE (OUTPATIENT)
Dept: CARDIOLOGY CLINIC | Age: 36
End: 2021-01-19

## 2021-01-19 ENCOUNTER — CARE COORDINATION (OUTPATIENT)
Dept: CASE MANAGEMENT | Age: 36
End: 2021-01-19

## 2021-01-19 NOTE — TELEPHONE ENCOUNTER
Wife called patient was just discharged   She is asking if he is to be on a blood   Thinner he was on Lovenox while he   Was admitted ,He had a very low  Heart rate and had COVID as well   Follow up made as well

## 2021-01-19 NOTE — CARE COORDINATION
[facility]    Call within 2 business days of discharge: Yes    Patient: Trinity Velázquez Patient : 1985   MRN: [id number]  Reason for Admission:   CP/ COVID-19  Discharge Date: 21 RARS: No data recorded    Last Discharge  [place] [address]       Complaint Diagnosis Description Type Department Provider    21 Chest Pain; Positive For Covid-19 Bradycardia . .. ED to Hosp-Admission (Discharged) (ADMITTED) [facility] 4E Yung Gomez MD; Sandor Wilson . .. Follow Up:  COVID-19 Risk Monitoring:    COVID-19:  Detected    Attempted patient contact x 2. No answer to phone. Message left with CTN contact information and request for return call. Return call received from patient. Challenges to be reviewed by the provider   N/A    COVID-19 :  Detected:  Patient confirmed awareness. Advance Care Planning:   Does patient have an Advance Directive:  No  Confirmed spouse as HCDM. Was this a readmission? No  Patient stated reason for admission: HA/ fatigue/ CP  Patients top risk factors for readmission: COVID+    Care Transition Nurse (CTN) contacted the patient by telephone to perform post hospital discharge assessment. Verified name and  with patient as identifiers. Provided introduction to self, and explanation of the CTN role. Patient reports that he is tired ; but is feeling better. Slept 12 hours last evening. Denies increased SOB, CP, fever, N/V/D. Discussed COVID symptoms. Instructed on infection prevention measures such as proper hand hygiene, mask, social distancing , and disinfection of shared surfaces. Instructed on worsening s/s to report to MD.    Patient reports that he has not been contacted by the Health Department to this point. Verbalized his plan to self quarantine as directed. Reports family support. Denies any barriers to obtaining food, medication or supplies.     CTN reviewed discharge instructions, medical action plan and red flags with patient who verbalized understanding. Patient was given an opportunity to ask questions and does not have any further questions or concerns at this time. Were discharge instructions available to patient? Yes. Reviewed appropriate site of care based on symptoms and resources available to patient including: COVID risk ed. . The patient agrees to contact the PCP office for questions related to their healthcare. Medication reconciliation was performed with patient, who verbalizes understanding of administration of home medications. Advised obtaining a 90-day supply of all daily and as-needed medications. Covid Risk Education    Patient has following risk factors of: COVID+. Education provided regarding infection prevention, and signs and symptoms of COVID-19 and when to seek medical attention with patient who verbalized understanding. Discussed exposure protocols and quarantine From CDC: Are you at higher risk for severe illness?   and given an opportunity for questions and concerns. The patient agrees to contact the COVID-19 hotline [phone] or PCP office for questions related to COVID-19. For more information on steps you can take to protect yourself, see CDC's How to Protect Yourself     Patient/family/caregiver given information for Mala Ureña and agrees to enroll :  yes  Patient's preferred e-mail: [email or web address]  Patient's preferred phone number: [phone]  Enrolled per [facility] Team    Discussed follow-up appointments. If no appointment was previously scheduled, appointment scheduling offered:   Yes. Is follow up appointment scheduled within 7 days of discharge? Yes  Non-[facility] follow up appointment(s):     Patient will be further monitored by the [facility] Team as needed based on severity of symptoms and risk factors. CTN provided contact information for future needs. No future appointments.     Vera Carvalho RN

## 2021-01-20 NOTE — DISCHARGE SUMMARY
Discharge Summary    Name:  Amador Angelucci /Age/Sex: 1985  (38 y.o. male)   MRN & CSN:  [id number] & [id number] Admission Date/Time: 2021  1:30 AM   Attending:  No att. providers found Discharging Physician: Ange Lima MD     HPI:     Per H&P:  Amador Angelucci is a 38 y.o. male with rheumatoid arthritis, GERD, recently tested positive for Covid-2021 presented to ED mainly due to bradycardia. Patient reported that since he was tested positive for Covid, is extremely fatigued, feels heaviness in his chest.  Since last 2 days his symptoms have gotten worse, also reported tingling in his finger tips. His wife who is an RN checked his oxygen with pulse oximeter and noted that he was bradycardic with HR 45. Then she checked his iPhone which trended his heart rate in the last month(-) with an average heart rate of 53. But the heart rate was significantly getting lower in the last few days since Covid diagnosis with an average heart rate of 48, lowest today at 45. Patient's wife got concerned as they heard about young people dying of Covid due to cardiac complications and she wanted him to be evaluated in ER. Patient denied any dizziness, denied any presyncope/syncope, had intermittent fever, feeling hot and cold, denied any cough. Patient reported that he has taken OTC Tylenol sinus intermittently within the last few days. Patient denied taking any other medications. Initial vitals in ED-/75, HR 46, RR 17, temperature 98.1, saturating 96% on room air. Lab work significant for sodium 134, other lab work within normal range. Chest x-ray-no acute process. EKG with sinus bradycardia. Troponin less than 0.010. Hospital Course:     Maki Wood is a 39-year-old who presented to ED with various symptoms including chest heaviness and a slow heart rate. He had loss of taste and smell, fatigue, chills, and cough and congestion as well.  He had tested positive for Covid 5 days prior to arrival. His heart rate on his Apple watch was lower than it normally is, in the 40s. His family checked his pulse and it was in the 40s, so he came to the ED. He is an avid runner, and jogs 3 miles every other day. An echocardiogram done while here was normal except for a mildly dilated aortic root. He was seen by both cardiology and electrophysiology and it was noted that the bradycardia has not causing any compromise like hypotension. There is no AV block on telemetry. Electrophysiology recommended an outpatient GXT once he is better from Covid to evaluate his heart rate response. He was discharged home in stable condition after a 38-hour stay in observation. Problem list    Moderate COVID-19 disease  -He did not require dexamethasone or remdesivir for convalescent plasma. He was not hypoxic. Sinus bradycardia  -Electrophysiology recommends an outpatient GXT to evaluate his heart rate response    Transient and vague chest pain-resolved  -CTA chest showed minimal to mild bronchial wall thickening potentially due to pulmonary vascular congestion, reactive airway disease, or bronchitis per radiologist. Discussed with patient and suggested possible pulmonary consult if he gets short of breath while jogging. This can be followed up outpatient.  -Appreciate cardiology consult, recommend follow-up with cardiology    Mildly dilated aortic root on echo done while here-discussed with nurse practitioner [facility] with cardiology today-outpatient yearly echo recommended, and I advised the patient. Other problems    Rheumatoid arthritis  -He last had Humira in December 2019 and is no longer on it  -He takes prednisone as needed for arthritis.  He reports that within the last year he probably took 2 to 3 months worth of prednisone  -[facility]    Overweight with body mass index 30.3  -He reports that he lost 40 pounds since March    The patient expressed appropriate understanding of and agreement with the discharge recommendations, medications, and plan. Consults this admission:  IP CONSULT TO HOSPITALIST  IP CONSULT TO CARDIOLOGY  IP CONSULT TO INFECTIOUS DISEASES    Discharge Instruction:   Follow up appointments: Cardiology and EP  Primary care physician:  within 2 weeks    Diet:  regular diet   Activity: activity as tolerated  Disposition: Discharged to:   [x]Home, [][facility], []SNF, []Acute Rehab, []Hospice   Condition on discharge: Stable    Discharge Medications:      Jame Pulse   Home Medication Instructions [id number]    Printed on:01/20/21 1024   Medication Information                      dexlansoprazole (DEXILANT) 60 MG CPDR delayed release capsule  Take 60 mg by mouth daily             Multiple Vitamins-Minerals (THERAPEUTIC MULTIVITAMIN-MINERALS) tablet  Take 1 tablet by mouth daily             Nutritional Supplements (IMMUNE ENHANCE PO)  Take by mouth             predniSONE (DELTASONE) 5 MG tablet  Take 1 tablet by mouth 2 times daily as needed (joint pain/inflammation)             Turmeric 500 MG CAPS  Take by mouth                 Objective Findings at Discharge:   /73   Pulse (!) 46 Comment: notified RN Reba  Temp 97.8 °F (36.6 °C) (Oral)   Resp 16   Ht 6' 2\" (1.88 m)   Wt 235 lb 11.2 oz (106.9 kg)   SpO2 93%   BMI 30.26 kg/m²            PHYSICAL EXAM   GEN Awake male, sitting upright in bed in no apparent distress. Appears given age.     LABS:    CBC:   Lab Results   Component Value Date    WBC 6.1 01/18/2021    HGB 15.7 01/18/2021    HCT 47.3 01/18/2021    MCV 89.2 01/18/2021     01/18/2021     BMP:   Lab Results   Component Value Date     01/18/2021    K 4.4 01/18/2021     01/18/2021    CO2 21 01/18/2021    BUN 15 01/18/2021    CREATININE 1.0 01/18/2021    CALCIUM 9.0 01/18/2021     Hepatic:   Recent Labs     01/17/21  1114 01/18/21  0550   AST 16 20   ALT 16 17   BILITOT 0.6 0.3   ALKPHOS 65 66 central airways. Minimal to mild bronchial wall   thickening most prominent centrally. Minimal gravity dependent atelectasis   predominantly bilateral lower lobes. Linear opacity in the lingula, likely   scarring or subsegmental atelectasis. No pleural effusions nor   pneumothoraces. UPPER ABDOMEN:  Mild pancreatic atrophy. SOFT TISSUES/BONES:   No supraclavicular nor axillary lymphadenopathy. Minimal bilateral gynecomastia. No acute fractures nor suspicious osseous   lesions. Mild pectus excavatum deformity. Impression:       1. No findings of pulmonary embolism. 2. Minimal to mild bronchial wall thickening potentially due to pulmonary   vascular congestion, reactive airways disease, or bronchitis. 3. Mild cardiomegaly with the appearance potentially accentuated by mass   effect from the sternum in the setting of mild pectus excavatum. XR CHEST PORTABLE [[id number]] Collected: 01/17/21 0221     Order Status: Completed Updated: 01/17/21 0224     Narrative:       EXAMINATION:   ONE XRAY VIEW OF THE CHEST     1/17/2021 1:40 am     COMPARISON:   None. HISTORY:   ORDERING SYSTEM PROVIDED HISTORY: cp, covid   TECHNOLOGIST PROVIDED HISTORY:   Reason for exam:->cp, covid   Reason for Exam: Covid+   Acuity: Acute   Type of Exam: Initial     FINDINGS:   There is no acute consolidation or effusion. There is no pneumothorax. The   mediastinal structures are unremarkable. The upper abdomen is unremarkable. The extrathoracic soft tissues are unremarkable. There is no acute osseous   abnormality. Impression:       No acute cardiopulmonary process.           Discharge Time of 45 minutes    Electronically signed by Anita Nova MD on 1/20/2021 at 10:24 AM

## 2021-01-25 ENCOUNTER — OFFICE VISIT (OUTPATIENT)
Dept: CARDIOLOGY CLINIC | Age: 36
End: 2021-01-25
Payer: COMMERCIAL

## 2021-01-25 VITALS
HEIGHT: 75 IN | WEIGHT: 242.2 LBS | DIASTOLIC BLOOD PRESSURE: 52 MMHG | BODY MASS INDEX: 30.11 KG/M2 | SYSTOLIC BLOOD PRESSURE: 120 MMHG | HEART RATE: 58 BPM

## 2021-01-25 DIAGNOSIS — R00.1 BRADYCARDIA: Primary | ICD-10-CM

## 2021-01-25 PROCEDURE — 1111F DSCHRG MED/CURRENT MED MERGE: CPT | Performed by: INTERNAL MEDICINE

## 2021-01-25 PROCEDURE — 99213 OFFICE O/P EST LOW 20 MIN: CPT | Performed by: INTERNAL MEDICINE

## 2021-01-25 RX ORDER — ASPIRIN 325 MG
325 TABLET ORAL DAILY
COMMUNITY

## 2021-01-25 NOTE — PATIENT INSTRUCTIONS
Please hold on to these instructions the  will call you within 1-9 business days when we receive authorization from your insurance. Treadmill Stress test    WHAT TO EXPECT:     The exercise stress test is a test used to provide information about how the heart responds to exertion. It involves walking on a treadmill at increasing levels of difficulty, while electrocardiogram, heart rate, and blood pressure are monitored. ? This test will take approximately 1 hours: Please arrive at the office 5-10 min before the scheduled testing time. ? Once you are taken back to the stress lab you will be asked to read and sign a consent before proceeding with the test. At this time feel free to ask any question that you may have as the procedure is explained to you.   ? You will be attached to the EKG monitoring equipment, your blood pressure will be taken, and you will begin walking on the treadmill. The treadmill starts off slowly and every 3 minutes the treadmill speeds up and the elevation increases. The average person usually walks for a period of 6-8min. PREPARATION FOR TEST:        ? Eat a light meal such as juice and toast at least 2 hours prior to the procedure. ? AVOID CAFFEINE 24 HOURS PRIOR TO THE TEST: Including coffee, Tea, Darrick and other soft drinks even those labeled  caffeine free or decaffeinated. ? Please wear loose comfortable clothing and comfortable walking shoes. Please wear a short sleeved shirt. Please shower or bath and do not apply powder or lotion to the skin prior to testing, as the electrodes will adhere better giving us a clearer visual EKG recording. Please be informed that if you contact our office outside of normal business hours the physician on call cannot help with any scheduling or rescheduling issues, procedure instruction questions or any type of medication issue. We advise you for any urgent/emergency that you go to the nearest emergency room! PLEASE CALL OUR OFFICE DURING NORMAL BUSINESS HOURS    Monday - Friday   8 am to 5 pm    [place]: [place] 12: [phone]    [place]:  [phone]

## 2021-01-27 ENCOUNTER — PROCEDURE VISIT (OUTPATIENT)
Dept: CARDIOLOGY CLINIC | Age: 36
End: 2021-01-27
Payer: COMMERCIAL

## 2021-01-27 DIAGNOSIS — R00.1 BRADYCARDIA: ICD-10-CM

## 2021-01-27 PROCEDURE — 93015 CV STRESS TEST SUPVJ I&R: CPT | Performed by: INTERNAL MEDICINE

## 2021-03-01 ENCOUNTER — OFFICE VISIT (OUTPATIENT)
Dept: CARDIOLOGY CLINIC | Age: 36
End: 2021-03-01
Payer: COMMERCIAL

## 2021-03-01 VITALS
HEIGHT: 75 IN | BODY MASS INDEX: 29.99 KG/M2 | TEMPERATURE: 97 F | RESPIRATION RATE: 16 BRPM | WEIGHT: 241.2 LBS | OXYGEN SATURATION: 98 % | DIASTOLIC BLOOD PRESSURE: 68 MMHG | HEART RATE: 76 BPM | SYSTOLIC BLOOD PRESSURE: 112 MMHG

## 2021-03-01 DIAGNOSIS — R00.1 BRADYCARDIA: Primary | ICD-10-CM

## 2021-03-01 PROCEDURE — 99212 OFFICE O/P EST SF 10 MIN: CPT | Performed by: NURSE PRACTITIONER

## 2021-03-01 ASSESSMENT — ENCOUNTER SYMPTOMS
ORTHOPNEA: 0
SHORTNESS OF BREATH: 0
BACK PAIN: 1

## 2021-03-01 NOTE — PATIENT INSTRUCTIONS
Please be informed that if you contact our office outside of normal business hours the physician on call cannot help with any scheduling or rescheduling issues, procedure instruction questions or any type of medication issue. We advise you for any urgent/emergency that you go to the nearest emergency room!     PLEASE CALL OUR OFFICE DURING NORMAL BUSINESS HOURS    Monday - Friday   8 am to 5 pm    [place]: [phone]    [place]:  [phone]

## 2021-03-01 NOTE — LETTER
Concha MUELLER Tyshawn Kellyuser  1985  [id number]    Have you had any Chest Pain that is not new? - No    Have you had any Shortness of Breath - No    Have you had any dizziness - No    Have you had any palpitations that are not new?  - No    Do you have any edema - swelling in No      Do you have a surgery or procedure scheduled in the near future - No     Ask patient [facility] if [facility] are not already [facility]     Check to see if we have an E-MAIL on file for the patient     Check medication list thoroughly!!! AND RECONCILE OUTSIDE MEDICATIONS  If dose has changed change the entire order not just the MG  BE SURE TO ASK PATIENT IF THEY NEED MEDICATION REFILLS     At check out add to every patient's \"wrap up\" the following dot phrase AFTERHOURSEDUCATION and ensure we explain this to our patients

## 2021-03-01 NOTE — PROGRESS NOTES
3/1/2021  Primary cardiologist: Dr. Clotilde Roberts  is an established 38 y.o.  male here for follow-up on exercise treadmill test      SUBJECTIVE/OBJECTIVE:  HPI : Rona Tyler is a 62-year-old  who was admitted in January 2021 with bradycardia. He also was positive for Covid 19 at that time. He was noted to be bradycardic with rates down into the 30s. Jenny Berman reports he is feeling well. He denies any lightheadedness, dizziness or near syncope. States he was recently able to run about 2 miles however unfortunately hurt his back the following day so therefore exercise now is limited. He did receive Covid vaccinations    Review of Systems   Constitution: Negative for diaphoresis and malaise/fatigue. Cardiovascular: Negative for chest pain, claudication, dyspnea on exertion, irregular heartbeat, leg swelling, near-syncope, orthopnea, palpitations and paroxysmal nocturnal dyspnea. Respiratory: Negative for shortness of breath. Musculoskeletal: Positive for back pain. Neurological: Negative for dizziness and light-headedness. Vitals:    03/01/21 1604   BP: 112/68   Site: Right Upper Arm   Position: Sitting   Cuff Size: Medium Adult   Pulse: 76   Resp: 16   Temp: 97 °F (36.1 °C)   SpO2: 98%   Weight: 241 lb 3.2 oz (109.4 kg)   Height: 6' 3\" (1.905 m)     /68 (Site: Right Upper Arm, Position: Sitting, Cuff Size: Medium Adult)   Pulse 76   Temp 97 °F (36.1 °C)   Resp 16   Ht 6' 3\" (1.905 m)   Wt 241 lb 3.2 oz (109.4 kg)   SpO2 98%   BMI 30.15 kg/m²   No flowsheet data found. Wt Readings from Last 3 Encounters:   03/01/21 241 lb 3.2 oz (109.4 kg)   01/25/21 242 lb 3.2 oz (109.9 kg)   01/17/21 235 lb 11.2 oz (106.9 kg)     Body mass index is 30.15 kg/m².     Physical Exam :     Neck: supple,  no carotid bruit appreciated, JVD not appreciated    Respiratory:  Normal breath sounds, No respiratory distress, No wheezing Cardiovascular:  S1 S2 appreciated, Regular rate, regular rhythm,  no murmur appreciated, No rubs appreciated, No gallops appreciated, No chest tenderness. Extremities:  no edema to the lower extremities    All pertinent data reviewed and discussed with patient including:    Transthoracic echocardiogram : 01/2021   Expedited imaging was used to obtain protocol images to reduce the   sonographer's exposure during COVID-19 pandemic. Left ventricular systolic function is normal.   Ejection fraction is visually estimated at 50-55%. No significant valvular disease noted. No evidence of any pericardial effusion. Mildly dilated aortic root. ETT stress test 01/27/2021  Normal near maximal study negative for angina or ECG evidence of ischemia. Exercise tolerance is good. Patient exercised for 10 minutes on standard   Armando protocol. Appropriate hemodynamic response to exercise is noted. ASSESSMENT/PLAN:    Bradycardia  Patient asymptomatic from bradycardia. Bradycardia may have been secondary to recent Covid 19. Echocardiogram showed normal left ventricular systolic function with no significant valvular heart disease. He underwent exercise treadmill stress testing to check for chronotropic incompetence. He had a good exercise tolerance exercising for 10 minutes on standard Armando protocol. Near normal maximal study. Appropriate hemodynamic response to exercise. At this time no further testing recommended. Medications reviewed and confirmed with patient     Tests ordered:  None     OV in 1 year or sooner if needed     Signed:  Ruben Baltazar, 3/1/2021, 4:28 PM    An electronic signature was used to authenticate this note.

## 2022-03-01 ENCOUNTER — OFFICE VISIT (OUTPATIENT)
Dept: CARDIOLOGY CLINIC | Age: 37
End: 2022-03-01
Payer: COMMERCIAL

## 2022-03-01 VITALS
DIASTOLIC BLOOD PRESSURE: 72 MMHG | HEIGHT: 75 IN | WEIGHT: 240.6 LBS | HEART RATE: 73 BPM | SYSTOLIC BLOOD PRESSURE: 116 MMHG | BODY MASS INDEX: 29.91 KG/M2

## 2022-03-01 DIAGNOSIS — R00.1 BRADYCARDIA: Primary | ICD-10-CM

## 2022-03-01 PROCEDURE — G8427 DOCREV CUR MEDS BY ELIG CLIN: HCPCS | Performed by: INTERNAL MEDICINE

## 2022-03-01 PROCEDURE — 1036F TOBACCO NON-USER: CPT | Performed by: INTERNAL MEDICINE

## 2022-03-01 PROCEDURE — G8484 FLU IMMUNIZE NO ADMIN: HCPCS | Performed by: INTERNAL MEDICINE

## 2022-03-01 PROCEDURE — 99213 OFFICE O/P EST LOW 20 MIN: CPT | Performed by: INTERNAL MEDICINE

## 2022-03-01 PROCEDURE — G8417 CALC BMI ABV UP PARAM F/U: HCPCS | Performed by: INTERNAL MEDICINE

## 2022-03-01 NOTE — PROGRESS NOTES
CARDIOLOGY NOTE      3/1/2022    RE: Fatmata Ch  (1985)                               TO:  Dr. Petra Jim MD            Lela Tyler is a 39 y.o. male who was seen today for management of bradycardia                                    HPI:                   Pt has h/o bradycardia, Covid, seen today for follow-up. Pt has no cardiac complaints    Fatmata Ch has the following history recorded in care path:  Patient Active Problem List    Diagnosis Date Noted    Bradycardia 01/17/2021    Lipoma of right upper extremity 03/08/2020    Tennis elbow 04/24/2016     Current Outpatient Medications   Medication Sig Dispense Refill    esomeprazole (NEXIUM) 20 MG delayed release capsule       predniSONE (DELTASONE) 5 MG tablet Take 1 tablet by mouth 2 times daily as needed (joint pain/inflammation) (Patient taking differently: Take 5 mg by mouth 2 times daily as needed (joint pain/inflammation) prn) 1 tablet 0    Turmeric 500 MG CAPS Take by mouth      Multiple Vitamins-Minerals (THERAPEUTIC MULTIVITAMIN-MINERALS) tablet Take 1 tablet by mouth daily      Nutritional Supplements (IMMUNE ENHANCE PO) Take by mouth      aspirin 325 MG tablet Take 325 mg by mouth daily (Patient not taking: Reported on 3/1/2022)      dexlansoprazole (DEXILANT) 60 MG CPDR delayed release capsule Take 60 mg by mouth daily (Patient not taking: Reported on 3/1/2022)       No current facility-administered medications for this visit. Allergies: Coconut oil  Past Medical History:   Diagnosis Date    Heartburn     History of exercise stress test 01/27/2021    Treadmill, Normal near maximal study negative for angina or ECG evidence of ischemia. Appropriate hemodynamic response to exercise is noted.     History of fracture of clavicle     History of wrist fracture     2 left and 2 right     Past Surgical History:   Procedure Laterality Date    HERNIA REPAIR  2009    KNEE SURGERY Left 1998    OTHER LABA1C  Lab Results   Component Value Date    WBC 6.1 01/18/2021    HCT 47.3 01/18/2021    MCV 89.2 01/18/2021     01/18/2021     No results found for: CHOL, TRIG, HDL, LDLCALC, LDLDIRECT, CHOLHDLRATIO  Lab Results   Component Value Date    ALT 17 01/18/2021    AST 20 01/18/2021     BMP:    Lab Results   Component Value Date     01/18/2021    K 4.4 01/18/2021     01/18/2021    CO2 21 01/18/2021    BUN 15 01/18/2021    CREATININE 1.0 01/18/2021     CMP:   Lab Results   Component Value Date     01/18/2021    K 4.4 01/18/2021     01/18/2021    CO2 21 01/18/2021    BUN 15 01/18/2021    PROT 7.0 01/18/2021     TSH:  No results found for: TSH, TSHHS        Assessment & Plan:    -Patient's bradycardia has resolved patient is totally asymptomatic at the present time as far as his cardiac status is concerned    -Has recovered from Covid    Discussed about the follow-up and he will call us as needed at present he is totally asymptomatic has no chest pain shortness of breath or any other symptoms        Raina Cramer MD    [facility] [place]    Please note this report has been partially produced using speech recognition software and may contain errors related to that system including errors in grammar, punctuation, and spelling, as well as words and phrases that may be inappropriate. If there are any questions or concerns please feel free to contact the dictating provider for clarification.

## 2023-03-01 ENCOUNTER — OFFICE VISIT (OUTPATIENT)
Dept: CARDIOLOGY CLINIC | Age: 38
End: 2023-03-01
Payer: COMMERCIAL

## 2023-03-01 VITALS
HEIGHT: 75 IN | WEIGHT: 236 LBS | SYSTOLIC BLOOD PRESSURE: 120 MMHG | DIASTOLIC BLOOD PRESSURE: 70 MMHG | BODY MASS INDEX: 29.34 KG/M2

## 2023-03-01 DIAGNOSIS — R00.1 BRADYCARDIA: Primary | ICD-10-CM

## 2023-03-01 PROCEDURE — 1036F TOBACCO NON-USER: CPT | Performed by: INTERNAL MEDICINE

## 2023-03-01 PROCEDURE — 93000 ELECTROCARDIOGRAM COMPLETE: CPT | Performed by: INTERNAL MEDICINE

## 2023-03-01 PROCEDURE — G8428 CUR MEDS NOT DOCUMENT: HCPCS | Performed by: INTERNAL MEDICINE

## 2023-03-01 PROCEDURE — G8484 FLU IMMUNIZE NO ADMIN: HCPCS | Performed by: INTERNAL MEDICINE

## 2023-03-01 PROCEDURE — 99213 OFFICE O/P EST LOW 20 MIN: CPT | Performed by: INTERNAL MEDICINE

## 2023-03-01 PROCEDURE — G8417 CALC BMI ABV UP PARAM F/U: HCPCS | Performed by: INTERNAL MEDICINE

## 2023-03-01 RX ORDER — CHLORAL HYDRATE 500 MG
CAPSULE ORAL DAILY
COMMUNITY

## 2023-03-01 NOTE — PATIENT INSTRUCTIONS
**It is YOUR responsibilty to bring medication bottles and/or updated medication list to [address] [place] [address]. This will allow us to better serve you and all your healthcare needs**    [facility] Locations - No appointment necessary. Sites open Monday to Friday. Call your preferred location for test preparation, business   hours and other information you need. [employer] accepts [employer]. [address] [facility], [address] [facility]  Phone: [phone] [place] C[place]ina  [address] [facility]  [address] [place], [address] [facility]  Phone: [phone]     Thank you for allowing us to care for you today! We want to ensure we can follow your treatment plan and we strive to give you the best outcomes and experience possible. If you ever have a life threatening emergency and call 911 - for an ambulance (EMS)   Our providers can only care for you at:   [facility] or [facility]. Even if you have someone take you or you drive yourself we can only care for you in a [address] [facility] facility. Our providers are not setup at the other healthcare locations! Please be informed that if you contact our office outside of normal business hours the physician on call cannot help with any scheduling or rescheduling issues, procedure instruction questions or any type of medication issue. We advise you for any urgent/emergency that you go to the nearest emergency room!     PLEASE CALL OUR OFFICE DURING NORMAL BUSINESS HOURS    Monday - Friday   8 am to 5 pm    [place] Dw[place] 12: [phone]    [place]:  [phone]

## 2023-03-01 NOTE — PROGRESS NOTES
CARDIOLOGY NOTE      3/1/2023    RE: Zeenat Renee  (1985)                               TO:  Dr. Harjinder Baires MD            Graciela Staples is a 44 y.o. male who was seen today for management of bradycardia                                    HPI:                   Pt has h/o bradycardia, Covid, seen today for follow-up. Pt has no cardiac complaints    Zeenat Renee has the following history recorded in care path:  Patient Active Problem List    Diagnosis Date Noted    Bradycardia 01/17/2021    Lipoma of right upper extremity 03/08/2020    Tennis elbow 04/24/2016     Current Outpatient Medications   Medication Sig Dispense Refill    Omega-3 Fatty Acids (FISH OIL) 1000 MG capsule Take by mouth daily      Biotin 76539 MCG TBDP Take by mouth      esomeprazole (NEXIUM) 20 MG delayed release capsule       Turmeric 500 MG CAPS Take by mouth      Multiple Vitamins-Minerals (THERAPEUTIC MULTIVITAMIN-MINERALS) tablet Take 1 tablet by mouth daily      Nutritional Supplements (IMMUNE ENHANCE PO) Take by mouth      aspirin 325 MG tablet Take 325 mg by mouth daily (Patient not taking: No sig reported)      predniSONE (DELTASONE) 5 MG tablet Take 1 tablet by mouth 2 times daily as needed (joint pain/inflammation) (Patient not taking: Reported on 3/1/2023) 1 tablet 0    dexlansoprazole (DEXILANT) 60 MG CPDR delayed release capsule Take 60 mg by mouth daily (Patient not taking: No sig reported)       No current facility-administered medications for this visit. Allergies: Coconut oil  Past Medical History:   Diagnosis Date    Heartburn     History of exercise stress test 01/27/2021    Treadmill, Normal near maximal study negative for angina or ECG evidence of ischemia. Appropriate hemodynamic response to exercise is noted.     History of fracture of clavicle     History of wrist fracture     2 left and 2 right     Past Surgical History:   Procedure Laterality Date    HERNIA REPAIR  2009    KNEE SURGERY Left 1998    OTHER SURGICAL HISTORY  2006    testicular torsion    SHOULDER ARTHROSCOPY Left 2013      As reviewed   Family History   Problem Relation Age of Onset    Cancer Other         grandfather    Heart Disease Other         mother's side    Allergies Father     Other Father         drug/alcohol abuse/ulcer    Other Mother         drug/alcohol abuse    Other Other         ulcer-  grandfather     Social History     Tobacco Use    Smoking status: Never    Smokeless tobacco: Former   Substance Use Topics    Alcohol use: Yes     Comment: social         Objective:    Vitals:    03/01/23 1636   BP: 120/70   Site: Left Upper Arm   Position: Sitting   Cuff Size: Large Adult   Weight: 236 lb (107 kg)   Height: 6' 3\" (1.905 m)       /70 (Site: Left Upper Arm, Position: Sitting, Cuff Size: Large Adult)   Ht 6' 3\" (1.905 m)   Wt 236 lb (107 kg)   BMI 29.50 kg/m²     No flowsheet data found. Wt Readings from Last 3 Encounters:   03/01/23 236 lb (107 kg)   03/01/22 240 lb 9.6 oz (109.1 kg)   03/01/21 241 lb 3.2 oz (109.4 kg)     Body mass index is 29.5 kg/m². GENERAL - Alert, oriented, pleasant, in no apparent distress. EYES: No jaundice, no conjunctival pallor. SKIN: It is warm & dry. No rashes. No Echhymosis    HEENT - No clinically significant abnormalities seen. Neck - Supple. No jugular venous distention noted. No carotid bruits. Cardiovascular - Normal S1 and S2 without obvious murmur or gallop. Extremities - No cyanosis, clubbing, or significant edema. Pulmonary - No respiratory distress. No wheezes or rales. Abdomen - No masses, tenderness, or organomegaly. Musculoskeletal - No significant edema. No joint deformities. No muscle wasting. Neurologic - Cranial nerves II through XII are grossly intact. There were no gross focal neurologic abnormalities.     Lab Review   Lab Results   Component Value Date/Time    TROPONINT <0.010 01/17/2021 11:14 AM     BNP:  No results found for: BNP  PT/INR:    Lab Results   Component Value Date    INR 1.01 01/18/2021     No results found for: LABA1C  Lab Results   Component Value Date    WBC 6.1 01/18/2021    HCT 47.3 01/18/2021    MCV 89.2 01/18/2021     01/18/2021     No results found for: CHOL, TRIG, HDL, LDLCALC, LDLDIRECT, CHOLHDLRATIO  Lab Results   Component Value Date    ALT 17 01/18/2021    AST 20 01/18/2021     BMP:    Lab Results   Component Value Date/Time     01/18/2021 05:50 AM    K 4.4 01/18/2021 05:50 AM     01/18/2021 05:50 AM    CO2 21 01/18/2021 05:50 AM    BUN 15 01/18/2021 05:50 AM    CREATININE 1.0 01/18/2021 05:50 AM     CMP:   Lab Results   Component Value Date/Time     01/18/2021 05:50 AM    K 4.4 01/18/2021 05:50 AM     01/18/2021 05:50 AM    CO2 21 01/18/2021 05:50 AM    BUN 15 01/18/2021 05:50 AM    PROT 7.0 01/18/2021 05:50 AM     TSH:  No results found for: TSH, TSHHS        Assessment & Plan:    -Patient's bradycardia has resolved patient is totally asymptomatic at the present time as far as his cardiac status is concerned    -Has recovered from Covid    Discussed about the follow-up and he will call us as needed at present he is totally asymptomatic has no chest pain shortness of breath or any other symptoms        Andrew Lugo MD    [facility] [place]    Please note this report has been partially produced using speech recognition software and may contain errors related to that system including errors in grammar, punctuation, and spelling, as well as words and phrases that may be inappropriate. If there are any questions or concerns please feel free to contact the dictating provider for clarification.

## 2024-03-01 ENCOUNTER — OFFICE VISIT (OUTPATIENT)
Dept: CARDIOLOGY CLINIC | Age: 39
End: 2024-03-01
Payer: COMMERCIAL

## 2024-03-01 VITALS
WEIGHT: 228.4 LBS | BODY MASS INDEX: 28.4 KG/M2 | DIASTOLIC BLOOD PRESSURE: 70 MMHG | RESPIRATION RATE: 16 BRPM | SYSTOLIC BLOOD PRESSURE: 118 MMHG | HEART RATE: 67 BPM | HEIGHT: 75 IN | OXYGEN SATURATION: 97 %

## 2024-03-01 DIAGNOSIS — R00.1 BRADYCARDIA: Primary | ICD-10-CM

## 2024-03-01 PROCEDURE — G8427 DOCREV CUR MEDS BY ELIG CLIN: HCPCS | Performed by: NURSE PRACTITIONER

## 2024-03-01 PROCEDURE — G8417 CALC BMI ABV UP PARAM F/U: HCPCS | Performed by: NURSE PRACTITIONER

## 2024-03-01 PROCEDURE — 1036F TOBACCO NON-USER: CPT | Performed by: NURSE PRACTITIONER

## 2024-03-01 PROCEDURE — G8484 FLU IMMUNIZE NO ADMIN: HCPCS | Performed by: NURSE PRACTITIONER

## 2024-03-01 PROCEDURE — 99212 OFFICE O/P EST SF 10 MIN: CPT | Performed by: NURSE PRACTITIONER

## 2024-03-01 PROCEDURE — 93000 ELECTROCARDIOGRAM COMPLETE: CPT | Performed by: NURSE PRACTITIONER

## 2024-03-01 ASSESSMENT — ENCOUNTER SYMPTOMS
SHORTNESS OF BREATH: 0
ORTHOPNEA: 0

## 2024-03-01 NOTE — PROGRESS NOTES
Skin is warm and dry.      Capillary Refill: Capillary refill takes less than 2 seconds.   Neurological:      Mental Status: He is alert and oriented to person, place, and time.                Current Outpatient Medications   Medication Sig Dispense Refill    Omega-3 Fatty Acids (FISH OIL) 1000 MG capsule Take by mouth daily      Biotin 08110 MCG TBDP Take by mouth      esomeprazole (NEXIUM) 20 MG delayed release capsule       Turmeric 500 MG CAPS Take by mouth      Multiple Vitamins-Minerals (THERAPEUTIC MULTIVITAMIN-MINERALS) tablet Take 1 tablet by mouth daily      Nutritional Supplements (IMMUNE ENHANCE PO) Take by mouth      aspirin 325 MG tablet Take 325 mg by mouth daily (Patient not taking: Reported on 3/1/2024)      predniSONE (DELTASONE) 5 MG tablet Take 1 tablet by mouth 2 times daily as needed (joint pain/inflammation) (Patient not taking: Reported on 3/1/2023) 1 tablet 0    dexlansoprazole (DEXILANT) 60 MG CPDR delayed release capsule Take 60 mg by mouth daily (Patient not taking: Reported on 3/1/2022)       No current facility-administered medications for this visit.          All pertinent data reviewed and discussed with patient       ASSESSMENT/PLAN:    Bradycardia  In the setting of COVID.   Noninvasive testing with echo and stress test have been normal.  He has had no symptoms of dizziness, lightheadedness or syncope.  EKG today shows sinus rhythm with no acute ST or T wave abnormalities.  Will plan to see him as needed.        Signed:  JONAS Dumas CNP, 3/1/2024, 4:27 PM    An electronic signature was used to authenticate this note.    Please note this report has been partially produced using speech recognition software and may contain errors related to that system including errors in grammar, punctuation, and spelling, as well as words and phrases that may be inappropriate. If there are any questions or concerns please feel free to contact the dictating provider for clarification.

## 2025-06-30 ENCOUNTER — TELEPHONE (OUTPATIENT)
Dept: CARDIOLOGY CLINIC | Age: 40
End: 2025-06-30

## 2025-06-30 NOTE — TELEPHONE ENCOUNTER
Dr. Andrea Tidwell       [facility]:      MRN:[id number]      Chest Pain:    Are you experiencing new onset chest pain: Yes    What type of pain is it? - Sharp Pain    How long: Days    PainScale:7    Meds taken: baby aspirin

## 2025-06-30 NOTE — TELEPHONE ENCOUNTER
Spoke with patient, he is wanting a OV for chest pressure that started a few days ago. Does not feel like he needs to go to ED. Could not give me VS. OV scheduled

## 2025-07-01 ENCOUNTER — OFFICE VISIT (OUTPATIENT)
Dept: CARDIOLOGY CLINIC | Age: 40
End: 2025-07-01
Payer: COMMERCIAL

## 2025-07-01 VITALS
WEIGHT: 255 LBS | HEART RATE: 66 BPM | BODY MASS INDEX: 31.71 KG/M2 | DIASTOLIC BLOOD PRESSURE: 78 MMHG | HEIGHT: 75 IN | SYSTOLIC BLOOD PRESSURE: 124 MMHG

## 2025-07-01 DIAGNOSIS — R07.2 PRECORDIAL PAIN: Primary | ICD-10-CM

## 2025-07-01 PROCEDURE — 93000 ELECTROCARDIOGRAM COMPLETE: CPT | Performed by: NURSE PRACTITIONER

## 2025-07-01 PROCEDURE — G8427 DOCREV CUR MEDS BY ELIG CLIN: HCPCS | Performed by: NURSE PRACTITIONER

## 2025-07-01 PROCEDURE — 99213 OFFICE O/P EST LOW 20 MIN: CPT | Performed by: NURSE PRACTITIONER

## 2025-07-01 PROCEDURE — G8419 CALC BMI OUT NRM PARAM NOF/U: HCPCS | Performed by: NURSE PRACTITIONER

## 2025-07-01 PROCEDURE — 1036F TOBACCO NON-USER: CPT | Performed by: NURSE PRACTITIONER

## 2025-07-01 RX ORDER — PREDNISONE 5 MG/1
5 TABLET ORAL DAILY
COMMUNITY

## 2025-07-01 ASSESSMENT — ENCOUNTER SYMPTOMS
ORTHOPNEA: 0
SHORTNESS OF BREATH: 0

## 2025-07-01 NOTE — PROGRESS NOTES
CLINICAL STAFF DOCUMENTATION         Joshua Spangler  1985  [id number]    Have you had any Chest Pain recently? - Yes  What type of pain is it? - Tightness / sharp pain / patient states it is in the spot of chest each time.   Tender to palpate (touch)? No  Does the pain radiate or stay in one place? - stays in one place   How long does the pain last? -  seconds    How Severe is the pain? - normally around 5 can shoot up to a 7  Is there anything that aggravates or triggers the pain? Pt denies   Did you take any medication? Baby aspirin    And did it relieve the pain - No  Is there anything that relieves it? Pt denies   Do you have any other symptoms at the same time? SOB        Have you had any Shortness of Breath - No      Have you had any dizziness - No    Have you had any palpitations recently? - No      Do you have any edema - swelling in both ankles and feet.         Do you have a surgery or procedure scheduled in the near future - No        Caffeine? - Yes  How much caffeine? .1 cup of coffee or 1 soda / day

## 2025-07-01 NOTE — PROGRESS NOTES
7/1/2025  Primary cardiologist: Dr. Tidwell    CC:   Joshua  is an established 39 y.o.  male here for chest pain       SUBJECTIVE/OBJECTIVE:  Joshua is a 39 y.o. male with a history of bradycardia.  Family history of cardiovascular disease       HPI :   Joshua states he has been having chest pain. The chest pain is localized to the right chest. Pain is no radiating - no exacerbating symptoms.      Review of Systems   Constitutional: Negative for diaphoresis and malaise/fatigue.   Cardiovascular:  Positive for chest pain. Negative for claudication, dyspnea on exertion, irregular heartbeat, leg swelling, near-syncope, orthopnea, palpitations and paroxysmal nocturnal dyspnea.   Respiratory:  Negative for shortness of breath.    Neurological:  Negative for dizziness and light-headedness.       Vitals:    07/01/25 1117   BP: 124/78   BP Site: Left Upper Arm   Patient Position: Sitting   BP Cuff Size: Large Adult   Pulse: 66   Weight: 115.7 kg (255 lb)   Height: 1.905 m (6' 3\")     Wt Readings from Last 3 Encounters:   07/01/25 115.7 kg (255 lb)   03/01/24 103.6 kg (228 lb 6.4 oz)   03/01/23 107 kg (236 lb)      Body mass index is 31.87 kg/m².     Physical Exam  Vitals reviewed.   Eyes:      Pupils: Pupils are equal, round, and reactive to light.   Neck:      Vascular: No carotid bruit.   Cardiovascular:      Rate and Rhythm: Normal rate and regular rhythm.      Pulses: Normal pulses.   Pulmonary:      Effort: Pulmonary effort is normal.      Breath sounds: Normal breath sounds.   Chest:          Comments: Chest pain to right chest   Musculoskeletal:      Cervical back: No tenderness.      Right lower leg: No edema.      Left lower leg: No edema.   Skin:     General: Skin is warm and dry.      Capillary Refill: Capillary refill takes less than 2 seconds.   Neurological:      Mental Status: He is alert and oriented to person, place, and time.                Current Outpatient Medications   Medication Sig Dispense

## 2025-07-18 ENCOUNTER — TELEPHONE (OUTPATIENT)
Dept: CARDIOLOGY CLINIC | Age: 40
End: 2025-07-18